# Patient Record
Sex: FEMALE | Race: BLACK OR AFRICAN AMERICAN | NOT HISPANIC OR LATINO | Employment: FULL TIME | URBAN - METROPOLITAN AREA
[De-identification: names, ages, dates, MRNs, and addresses within clinical notes are randomized per-mention and may not be internally consistent; named-entity substitution may affect disease eponyms.]

---

## 2021-01-11 ENCOUNTER — HOSPITAL ENCOUNTER (INPATIENT)
Facility: HOSPITAL | Age: 61
LOS: 1 days | Discharge: HOME | DRG: 177 | End: 2021-01-13
Attending: EMERGENCY MEDICINE | Admitting: EMERGENCY MEDICINE
Payer: COMMERCIAL

## 2021-01-11 DIAGNOSIS — R06.02 SOB (SHORTNESS OF BREATH): ICD-10-CM

## 2021-01-11 DIAGNOSIS — U07.1 COVID-19 VIRUS INFECTION: Primary | ICD-10-CM

## 2021-01-11 DIAGNOSIS — R09.02 HYPOXEMIA: ICD-10-CM

## 2021-01-11 LAB
BASOPHILS # BLD AUTO: 0.02 K/UL (ref 0–0.2)
BASOPHILS NFR BLD: 0.3 % (ref 0–1.9)
BNP SERPL-MCNC: <10 PG/ML (ref 0–99)
CTP QC/QA: YES
D DIMER PPP IA.FEU-MCNC: 0.51 MG/L FEU
DIFFERENTIAL METHOD: ABNORMAL
EOSINOPHIL # BLD AUTO: 0.1 K/UL (ref 0–0.5)
EOSINOPHIL NFR BLD: 0.7 % (ref 0–8)
ERYTHROCYTE [DISTWIDTH] IN BLOOD BY AUTOMATED COUNT: 14.6 % (ref 11.5–14.5)
HCT VFR BLD AUTO: 46.6 % (ref 37–48.5)
HGB BLD-MCNC: 15 G/DL (ref 12–16)
IMM GRANULOCYTES # BLD AUTO: 0.03 K/UL (ref 0–0.04)
IMM GRANULOCYTES NFR BLD AUTO: 0.4 % (ref 0–0.5)
LYMPHOCYTES # BLD AUTO: 2.8 K/UL (ref 1–4.8)
LYMPHOCYTES NFR BLD: 40.5 % (ref 18–48)
MAGNESIUM SERPL-MCNC: 2.3 MG/DL (ref 1.6–2.6)
MCH RBC QN AUTO: 27.6 PG (ref 27–31)
MCHC RBC AUTO-ENTMCNC: 32.2 G/DL (ref 32–36)
MCV RBC AUTO: 86 FL (ref 82–98)
MONOCYTES # BLD AUTO: 0.6 K/UL (ref 0.3–1)
MONOCYTES NFR BLD: 8.4 % (ref 4–15)
NEUTROPHILS # BLD AUTO: 3.5 K/UL (ref 1.8–7.7)
NEUTROPHILS NFR BLD: 49.7 % (ref 38–73)
NRBC BLD-RTO: 0 /100 WBC
PLATELET # BLD AUTO: 289 K/UL (ref 150–350)
PMV BLD AUTO: 10.3 FL (ref 9.2–12.9)
RBC # BLD AUTO: 5.44 M/UL (ref 4–5.4)
SARS-COV-2 RDRP RESP QL NAA+PROBE: POSITIVE
WBC # BLD AUTO: 7.02 K/UL (ref 3.9–12.7)

## 2021-01-11 PROCEDURE — 93010 ELECTROCARDIOGRAM REPORT: CPT | Mod: ,,, | Performed by: INTERNAL MEDICINE

## 2021-01-11 PROCEDURE — 94640 AIRWAY INHALATION TREATMENT: CPT

## 2021-01-11 PROCEDURE — 83880 ASSAY OF NATRIURETIC PEPTIDE: CPT

## 2021-01-11 PROCEDURE — 25000242 PHARM REV CODE 250 ALT 637 W/ HCPCS: Performed by: EMERGENCY MEDICINE

## 2021-01-11 PROCEDURE — 96365 THER/PROPH/DIAG IV INF INIT: CPT

## 2021-01-11 PROCEDURE — 83735 ASSAY OF MAGNESIUM: CPT

## 2021-01-11 PROCEDURE — 85025 COMPLETE CBC W/AUTO DIFF WBC: CPT

## 2021-01-11 PROCEDURE — 80053 COMPREHEN METABOLIC PANEL: CPT

## 2021-01-11 PROCEDURE — 99285 EMERGENCY DEPT VISIT HI MDM: CPT | Mod: 25

## 2021-01-11 PROCEDURE — 84484 ASSAY OF TROPONIN QUANT: CPT

## 2021-01-11 PROCEDURE — 93010 EKG 12-LEAD: ICD-10-PCS | Mod: ,,, | Performed by: INTERNAL MEDICINE

## 2021-01-11 PROCEDURE — 87502 INFLUENZA DNA AMP PROBE: CPT | Mod: 59

## 2021-01-11 PROCEDURE — U0002 COVID-19 LAB TEST NON-CDC: HCPCS | Performed by: EMERGENCY MEDICINE

## 2021-01-11 PROCEDURE — 27100098 HC SPACER

## 2021-01-11 PROCEDURE — 94761 N-INVAS EAR/PLS OXIMETRY MLT: CPT

## 2021-01-11 PROCEDURE — 96375 TX/PRO/DX INJ NEW DRUG ADDON: CPT

## 2021-01-11 PROCEDURE — 93005 ELECTROCARDIOGRAM TRACING: CPT

## 2021-01-11 PROCEDURE — 85379 FIBRIN DEGRADATION QUANT: CPT

## 2021-01-11 RX ORDER — ALBUTEROL SULFATE 90 UG/1
4 AEROSOL, METERED RESPIRATORY (INHALATION) EVERY 4 HOURS PRN
Status: DISCONTINUED | OUTPATIENT
Start: 2021-01-12 | End: 2021-01-13 | Stop reason: HOSPADM

## 2021-01-11 RX ORDER — ALBUTEROL SULFATE 2.5 MG/.5ML
5 SOLUTION RESPIRATORY (INHALATION)
Status: DISCONTINUED | OUTPATIENT
Start: 2021-01-11 | End: 2021-01-11 | Stop reason: DRUGHIGH

## 2021-01-11 RX ORDER — IPRATROPIUM BROMIDE 0.5 MG/2.5ML
1 SOLUTION RESPIRATORY (INHALATION) ONCE
Status: DISCONTINUED | OUTPATIENT
Start: 2021-01-11 | End: 2021-01-11 | Stop reason: DRUGHIGH

## 2021-01-11 RX ORDER — DEXAMETHASONE SODIUM PHOSPHATE 4 MG/ML
8 INJECTION, SOLUTION INTRA-ARTICULAR; INTRALESIONAL; INTRAMUSCULAR; INTRAVENOUS; SOFT TISSUE
Status: COMPLETED | OUTPATIENT
Start: 2021-01-11 | End: 2021-01-12

## 2021-01-11 RX ORDER — ALBUTEROL SULFATE 90 UG/1
4 AEROSOL, METERED RESPIRATORY (INHALATION) ONCE
Status: COMPLETED | OUTPATIENT
Start: 2021-01-11 | End: 2021-01-11

## 2021-01-11 RX ADMIN — ALBUTEROL SULFATE 4 PUFF: 90 AEROSOL, METERED RESPIRATORY (INHALATION) at 10:01

## 2021-01-12 PROBLEM — U07.1 COVID-19 VIRUS INFECTION: Status: ACTIVE | Noted: 2021-01-12

## 2021-01-12 PROBLEM — J96.01 ACUTE RESPIRATORY FAILURE WITH HYPOXIA: Status: ACTIVE | Noted: 2021-01-12

## 2021-01-12 PROBLEM — J12.82 PNEUMONIA DUE TO COVID-19 VIRUS: Status: ACTIVE | Noted: 2021-01-12

## 2021-01-12 LAB
25(OH)D3+25(OH)D2 SERPL-MCNC: 26 NG/ML (ref 30–96)
ALBUMIN SERPL BCP-MCNC: 3.5 G/DL (ref 3.5–5.2)
ALBUMIN SERPL BCP-MCNC: 3.6 G/DL (ref 3.5–5.2)
ALP SERPL-CCNC: 75 U/L (ref 55–135)
ALP SERPL-CCNC: 80 U/L (ref 55–135)
ALT SERPL W/O P-5'-P-CCNC: 26 U/L (ref 10–44)
ALT SERPL W/O P-5'-P-CCNC: 27 U/L (ref 10–44)
ANION GAP SERPL CALC-SCNC: 12 MMOL/L (ref 8–16)
ANION GAP SERPL CALC-SCNC: 14 MMOL/L (ref 8–16)
AST SERPL-CCNC: 23 U/L (ref 10–40)
AST SERPL-CCNC: 26 U/L (ref 10–40)
BASOPHILS # BLD AUTO: 0.01 K/UL (ref 0–0.2)
BASOPHILS NFR BLD: 0.2 % (ref 0–1.9)
BILIRUB SERPL-MCNC: 0.7 MG/DL (ref 0.1–1)
BILIRUB SERPL-MCNC: 0.8 MG/DL (ref 0.1–1)
BUN SERPL-MCNC: 12 MG/DL (ref 6–20)
BUN SERPL-MCNC: 9 MG/DL (ref 6–20)
CALCIUM SERPL-MCNC: 9.3 MG/DL (ref 8.7–10.5)
CALCIUM SERPL-MCNC: 9.4 MG/DL (ref 8.7–10.5)
CHLORIDE SERPL-SCNC: 101 MMOL/L (ref 95–110)
CHLORIDE SERPL-SCNC: 102 MMOL/L (ref 95–110)
CK SERPL-CCNC: 58 U/L (ref 20–180)
CO2 SERPL-SCNC: 24 MMOL/L (ref 23–29)
CO2 SERPL-SCNC: 26 MMOL/L (ref 23–29)
CREAT SERPL-MCNC: 0.7 MG/DL (ref 0.5–1.4)
CREAT SERPL-MCNC: 0.7 MG/DL (ref 0.5–1.4)
CRP SERPL-MCNC: 62.3 MG/L (ref 0–8.2)
DIFFERENTIAL METHOD: ABNORMAL
EOSINOPHIL # BLD AUTO: 0 K/UL (ref 0–0.5)
EOSINOPHIL NFR BLD: 0 % (ref 0–8)
ERYTHROCYTE [DISTWIDTH] IN BLOOD BY AUTOMATED COUNT: 14.3 % (ref 11.5–14.5)
ERYTHROCYTE [SEDIMENTATION RATE] IN BLOOD BY WESTERGREN METHOD: 66 MM/HR (ref 0–20)
EST. GFR  (AFRICAN AMERICAN): >60 ML/MIN/1.73 M^2
EST. GFR  (AFRICAN AMERICAN): >60 ML/MIN/1.73 M^2
EST. GFR  (NON AFRICAN AMERICAN): >60 ML/MIN/1.73 M^2
EST. GFR  (NON AFRICAN AMERICAN): >60 ML/MIN/1.73 M^2
FERRITIN SERPL-MCNC: 453 NG/ML (ref 20–300)
FERRITIN SERPL-MCNC: 453 NG/ML (ref 20–300)
GLUCOSE SERPL-MCNC: 102 MG/DL (ref 70–110)
GLUCOSE SERPL-MCNC: 155 MG/DL (ref 70–110)
HCT VFR BLD AUTO: 43.7 % (ref 37–48.5)
HGB BLD-MCNC: 14.4 G/DL (ref 12–16)
IMM GRANULOCYTES # BLD AUTO: 0.02 K/UL (ref 0–0.04)
IMM GRANULOCYTES NFR BLD AUTO: 0.4 % (ref 0–0.5)
INFLUENZA A, MOLECULAR: NEGATIVE
INFLUENZA B, MOLECULAR: NEGATIVE
LACTATE SERPL-SCNC: 1 MMOL/L (ref 0.5–2.2)
LDH SERPL L TO P-CCNC: 331 U/L (ref 110–260)
LDH SERPL L TO P-CCNC: 331 U/L (ref 110–260)
LYMPHOCYTES # BLD AUTO: 1 K/UL (ref 1–4.8)
LYMPHOCYTES NFR BLD: 17.8 % (ref 18–48)
MAGNESIUM SERPL-MCNC: 2.3 MG/DL (ref 1.6–2.6)
MCH RBC QN AUTO: 27.7 PG (ref 27–31)
MCHC RBC AUTO-ENTMCNC: 33 G/DL (ref 32–36)
MCV RBC AUTO: 84 FL (ref 82–98)
MONOCYTES # BLD AUTO: 0.1 K/UL (ref 0.3–1)
MONOCYTES NFR BLD: 1.6 % (ref 4–15)
NEUTROPHILS # BLD AUTO: 4.5 K/UL (ref 1.8–7.7)
NEUTROPHILS NFR BLD: 80 % (ref 38–73)
NRBC BLD-RTO: 0 /100 WBC
PHOSPHATE SERPL-MCNC: 3.3 MG/DL (ref 2.7–4.5)
PLATELET # BLD AUTO: 347 K/UL (ref 150–350)
PMV BLD AUTO: 9.8 FL (ref 9.2–12.9)
POTASSIUM SERPL-SCNC: 3.8 MMOL/L (ref 3.5–5.1)
POTASSIUM SERPL-SCNC: 4.4 MMOL/L (ref 3.5–5.1)
PROCALCITONIN SERPL IA-MCNC: 0.05 NG/ML
PROT SERPL-MCNC: 7.8 G/DL (ref 6–8.4)
PROT SERPL-MCNC: 8 G/DL (ref 6–8.4)
RBC # BLD AUTO: 5.19 M/UL (ref 4–5.4)
SODIUM SERPL-SCNC: 138 MMOL/L (ref 136–145)
SODIUM SERPL-SCNC: 141 MMOL/L (ref 136–145)
SPECIMEN SOURCE: NORMAL
TROPONIN I SERPL DL<=0.01 NG/ML-MCNC: <0.006 NG/ML (ref 0–0.03)
WBC # BLD AUTO: 5.62 K/UL (ref 3.9–12.7)

## 2021-01-12 PROCEDURE — 25000242 PHARM REV CODE 250 ALT 637 W/ HCPCS: Performed by: INTERNAL MEDICINE

## 2021-01-12 PROCEDURE — 83735 ASSAY OF MAGNESIUM: CPT

## 2021-01-12 PROCEDURE — 85025 COMPLETE CBC W/AUTO DIFF WBC: CPT

## 2021-01-12 PROCEDURE — 87040 BLOOD CULTURE FOR BACTERIA: CPT | Mod: 59

## 2021-01-12 PROCEDURE — 82550 ASSAY OF CK (CPK): CPT

## 2021-01-12 PROCEDURE — 25000003 PHARM REV CODE 250: Performed by: EMERGENCY MEDICINE

## 2021-01-12 PROCEDURE — 87502 INFLUENZA DNA AMP PROBE: CPT

## 2021-01-12 PROCEDURE — 86140 C-REACTIVE PROTEIN: CPT

## 2021-01-12 PROCEDURE — 25000003 PHARM REV CODE 250: Performed by: INTERNAL MEDICINE

## 2021-01-12 PROCEDURE — 11000001 HC ACUTE MED/SURG PRIVATE ROOM

## 2021-01-12 PROCEDURE — 25000003 PHARM REV CODE 250: Performed by: STUDENT IN AN ORGANIZED HEALTH CARE EDUCATION/TRAINING PROGRAM

## 2021-01-12 PROCEDURE — 83615 LACTATE (LD) (LDH) ENZYME: CPT

## 2021-01-12 PROCEDURE — 63600175 PHARM REV CODE 636 W HCPCS: Performed by: INTERNAL MEDICINE

## 2021-01-12 PROCEDURE — 83605 ASSAY OF LACTIC ACID: CPT

## 2021-01-12 PROCEDURE — 25000242 PHARM REV CODE 250 ALT 637 W/ HCPCS: Performed by: STUDENT IN AN ORGANIZED HEALTH CARE EDUCATION/TRAINING PROGRAM

## 2021-01-12 PROCEDURE — 63600175 PHARM REV CODE 636 W HCPCS: Performed by: EMERGENCY MEDICINE

## 2021-01-12 PROCEDURE — 80053 COMPREHEN METABOLIC PANEL: CPT

## 2021-01-12 PROCEDURE — 94640 AIRWAY INHALATION TREATMENT: CPT

## 2021-01-12 PROCEDURE — 84145 PROCALCITONIN (PCT): CPT

## 2021-01-12 PROCEDURE — 82306 VITAMIN D 25 HYDROXY: CPT

## 2021-01-12 PROCEDURE — 63600175 PHARM REV CODE 636 W HCPCS: Performed by: STUDENT IN AN ORGANIZED HEALTH CARE EDUCATION/TRAINING PROGRAM

## 2021-01-12 PROCEDURE — 94761 N-INVAS EAR/PLS OXIMETRY MLT: CPT

## 2021-01-12 PROCEDURE — 84100 ASSAY OF PHOSPHORUS: CPT

## 2021-01-12 PROCEDURE — 36415 COLL VENOUS BLD VENIPUNCTURE: CPT

## 2021-01-12 PROCEDURE — 85652 RBC SED RATE AUTOMATED: CPT

## 2021-01-12 PROCEDURE — 63700000 PHARM REV CODE 250 ALT 637 W/O HCPCS: Performed by: STUDENT IN AN ORGANIZED HEALTH CARE EDUCATION/TRAINING PROGRAM

## 2021-01-12 PROCEDURE — 87449 NOS EACH ORGANISM AG IA: CPT

## 2021-01-12 PROCEDURE — 82728 ASSAY OF FERRITIN: CPT

## 2021-01-12 RX ORDER — ASCORBIC ACID 500 MG
500 TABLET ORAL 2 TIMES DAILY
Status: DISCONTINUED | OUTPATIENT
Start: 2021-01-12 | End: 2021-01-13 | Stop reason: HOSPADM

## 2021-01-12 RX ORDER — ALBUTEROL SULFATE 90 UG/1
2 AEROSOL, METERED RESPIRATORY (INHALATION) EVERY 6 HOURS
Status: DISCONTINUED | OUTPATIENT
Start: 2021-01-12 | End: 2021-01-12

## 2021-01-12 RX ORDER — SODIUM CHLORIDE 0.9 % (FLUSH) 0.9 %
10 SYRINGE (ML) INJECTION
Status: DISCONTINUED | OUTPATIENT
Start: 2021-01-12 | End: 2021-01-13 | Stop reason: HOSPADM

## 2021-01-12 RX ORDER — LOSARTAN POTASSIUM AND HYDROCHLOROTHIAZIDE 12.5; 5 MG/1; MG/1
1 TABLET ORAL DAILY
Status: DISCONTINUED | OUTPATIENT
Start: 2021-01-12 | End: 2021-01-12

## 2021-01-12 RX ORDER — ONDANSETRON 8 MG/1
8 TABLET, ORALLY DISINTEGRATING ORAL EVERY 8 HOURS PRN
Status: DISCONTINUED | OUTPATIENT
Start: 2021-01-12 | End: 2021-01-13 | Stop reason: HOSPADM

## 2021-01-12 RX ORDER — IBUPROFEN 200 MG
16 TABLET ORAL
Status: DISCONTINUED | OUTPATIENT
Start: 2021-01-12 | End: 2021-01-13 | Stop reason: HOSPADM

## 2021-01-12 RX ORDER — TALC
6 POWDER (GRAM) TOPICAL NIGHTLY PRN
Status: DISCONTINUED | OUTPATIENT
Start: 2021-01-12 | End: 2021-01-13 | Stop reason: HOSPADM

## 2021-01-12 RX ORDER — HYDROCHLOROTHIAZIDE 25 MG/1
25 TABLET ORAL DAILY
Status: DISCONTINUED | OUTPATIENT
Start: 2021-01-13 | End: 2021-01-12

## 2021-01-12 RX ORDER — ALBUTEROL SULFATE 90 UG/1
2 AEROSOL, METERED RESPIRATORY (INHALATION)
Status: DISCONTINUED | OUTPATIENT
Start: 2021-01-12 | End: 2021-01-13 | Stop reason: HOSPADM

## 2021-01-12 RX ORDER — AZITHROMYCIN 250 MG/1
500 TABLET, FILM COATED ORAL
Status: DISCONTINUED | OUTPATIENT
Start: 2021-01-12 | End: 2021-01-12

## 2021-01-12 RX ORDER — DIPHENOXYLATE HYDROCHLORIDE AND ATROPINE SULFATE 2.5; .025 MG/1; MG/1
1 TABLET ORAL 4 TIMES DAILY PRN
Status: DISCONTINUED | OUTPATIENT
Start: 2021-01-12 | End: 2021-01-13 | Stop reason: HOSPADM

## 2021-01-12 RX ORDER — AZITHROMYCIN 250 MG/1
500 TABLET, FILM COATED ORAL
Status: DISCONTINUED | OUTPATIENT
Start: 2021-01-12 | End: 2021-01-13 | Stop reason: HOSPADM

## 2021-01-12 RX ORDER — HYDROCHLOROTHIAZIDE 25 MG/1
25 TABLET ORAL DAILY
Status: DISCONTINUED | OUTPATIENT
Start: 2021-01-12 | End: 2021-01-13 | Stop reason: HOSPADM

## 2021-01-12 RX ORDER — IBUPROFEN 200 MG
24 TABLET ORAL
Status: DISCONTINUED | OUTPATIENT
Start: 2021-01-12 | End: 2021-01-13 | Stop reason: HOSPADM

## 2021-01-12 RX ORDER — GLUCAGON 1 MG
1 KIT INJECTION
Status: DISCONTINUED | OUTPATIENT
Start: 2021-01-12 | End: 2021-01-13 | Stop reason: HOSPADM

## 2021-01-12 RX ORDER — LOSARTAN POTASSIUM 25 MG/1
100 TABLET ORAL DAILY
Status: DISCONTINUED | OUTPATIENT
Start: 2021-01-12 | End: 2021-01-13 | Stop reason: HOSPADM

## 2021-01-12 RX ORDER — GUAIFENESIN/DEXTROMETHORPHAN 100-10MG/5
10 SYRUP ORAL EVERY 4 HOURS PRN
Status: DISCONTINUED | OUTPATIENT
Start: 2021-01-12 | End: 2021-01-13 | Stop reason: HOSPADM

## 2021-01-12 RX ORDER — AMOXICILLIN 250 MG
1 CAPSULE ORAL 2 TIMES DAILY PRN
Status: DISCONTINUED | OUTPATIENT
Start: 2021-01-12 | End: 2021-01-13 | Stop reason: HOSPADM

## 2021-01-12 RX ORDER — ENOXAPARIN SODIUM 100 MG/ML
40 INJECTION SUBCUTANEOUS EVERY 24 HOURS
Status: DISCONTINUED | OUTPATIENT
Start: 2021-01-12 | End: 2021-01-13 | Stop reason: HOSPADM

## 2021-01-12 RX ORDER — CHOLECALCIFEROL (VITAMIN D3) 25 MCG
1000 TABLET ORAL DAILY
Status: DISCONTINUED | OUTPATIENT
Start: 2021-01-12 | End: 2021-01-13 | Stop reason: HOSPADM

## 2021-01-12 RX ORDER — ACETAMINOPHEN 325 MG/1
650 TABLET ORAL EVERY 4 HOURS PRN
Status: DISCONTINUED | OUTPATIENT
Start: 2021-01-12 | End: 2021-01-13 | Stop reason: HOSPADM

## 2021-01-12 RX ORDER — LOSARTAN POTASSIUM 25 MG/1
50 TABLET ORAL DAILY
Status: DISCONTINUED | OUTPATIENT
Start: 2021-01-12 | End: 2021-01-12

## 2021-01-12 RX ORDER — OXYCODONE HYDROCHLORIDE 5 MG/1
5 TABLET ORAL EVERY 6 HOURS PRN
Status: DISCONTINUED | OUTPATIENT
Start: 2021-01-12 | End: 2021-01-13 | Stop reason: HOSPADM

## 2021-01-12 RX ADMIN — HYDROCHLOROTHIAZIDE 25 MG: 25 TABLET ORAL at 08:01

## 2021-01-12 RX ADMIN — OXYCODONE HYDROCHLORIDE AND ACETAMINOPHEN 500 MG: 500 TABLET ORAL at 07:01

## 2021-01-12 RX ADMIN — GUAIFENESIN AND DEXTROMETHORPHAN 10 ML: 100; 10 SYRUP ORAL at 07:01

## 2021-01-12 RX ADMIN — LOSARTAN POTASSIUM 100 MG: 25 TABLET, FILM COATED ORAL at 08:01

## 2021-01-12 RX ADMIN — ALBUTEROL SULFATE 2 PUFF: 90 AEROSOL, METERED RESPIRATORY (INHALATION) at 08:01

## 2021-01-12 RX ADMIN — AZITHROMYCIN MONOHYDRATE 500 MG: 250 TABLET ORAL at 07:01

## 2021-01-12 RX ADMIN — THERA TABS 1 TABLET: TAB at 08:01

## 2021-01-12 RX ADMIN — DEXAMETHASONE 6 MG: 4 TABLET ORAL at 07:01

## 2021-01-12 RX ADMIN — IPRATROPIUM BROMIDE 2 PUFF: 17 AEROSOL, METERED RESPIRATORY (INHALATION) at 01:01

## 2021-01-12 RX ADMIN — IPRATROPIUM BROMIDE 2 PUFF: 17 AEROSOL, METERED RESPIRATORY (INHALATION) at 08:01

## 2021-01-12 RX ADMIN — CEFTRIAXONE 1 G: 1 INJECTION, SOLUTION INTRAVENOUS at 08:01

## 2021-01-12 RX ADMIN — ALBUTEROL SULFATE 2 PUFF: 90 AEROSOL, METERED RESPIRATORY (INHALATION) at 07:01

## 2021-01-12 RX ADMIN — AZITHROMYCIN 500 MG: 500 INJECTION, POWDER, LYOPHILIZED, FOR SOLUTION INTRAVENOUS at 12:01

## 2021-01-12 RX ADMIN — ALBUTEROL SULFATE 2 PUFF: 90 AEROSOL, METERED RESPIRATORY (INHALATION) at 01:01

## 2021-01-12 RX ADMIN — GUAIFENESIN AND DEXTROMETHORPHAN 10 ML: 100; 10 SYRUP ORAL at 02:01

## 2021-01-12 RX ADMIN — CHOLECALCIFEROL TAB 25 MCG (1000 UNIT) 1000 UNITS: 25 TAB at 08:01

## 2021-01-12 RX ADMIN — CEFTRIAXONE 1 G: 1 INJECTION, SOLUTION INTRAVENOUS at 12:01

## 2021-01-12 RX ADMIN — OXYCODONE HYDROCHLORIDE AND ACETAMINOPHEN 500 MG: 500 TABLET ORAL at 08:01

## 2021-01-12 RX ADMIN — DOCUSATE SODIUM 50 MG AND SENNOSIDES 8.6 MG 1 TABLET: 8.6; 5 TABLET, FILM COATED ORAL at 02:01

## 2021-01-12 RX ADMIN — REMDESIVIR 200 MG: 100 INJECTION, POWDER, LYOPHILIZED, FOR SOLUTION INTRAVENOUS at 03:01

## 2021-01-12 RX ADMIN — ENOXAPARIN SODIUM 40 MG: 40 INJECTION SUBCUTANEOUS at 04:01

## 2021-01-12 RX ADMIN — IPRATROPIUM BROMIDE 2 PUFF: 17 AEROSOL, METERED RESPIRATORY (INHALATION) at 07:01

## 2021-01-12 RX ADMIN — DEXAMETHASONE SODIUM PHOSPHATE 8 MG: 4 INJECTION INTRA-ARTICULAR; INTRALESIONAL; INTRAMUSCULAR; INTRAVENOUS; SOFT TISSUE at 12:01

## 2021-01-13 VITALS
WEIGHT: 242.5 LBS | OXYGEN SATURATION: 94 % | HEIGHT: 66 IN | SYSTOLIC BLOOD PRESSURE: 134 MMHG | RESPIRATION RATE: 18 BRPM | DIASTOLIC BLOOD PRESSURE: 73 MMHG | TEMPERATURE: 98 F | BODY MASS INDEX: 38.97 KG/M2 | HEART RATE: 79 BPM

## 2021-01-13 LAB
ALBUMIN SERPL BCP-MCNC: 3.6 G/DL (ref 3.5–5.2)
ALP SERPL-CCNC: 76 U/L (ref 55–135)
ALT SERPL W/O P-5'-P-CCNC: 30 U/L (ref 10–44)
ANION GAP SERPL CALC-SCNC: 12 MMOL/L (ref 8–16)
AST SERPL-CCNC: 21 U/L (ref 10–40)
BASOPHILS # BLD AUTO: 0.01 K/UL (ref 0–0.2)
BASOPHILS NFR BLD: 0.2 % (ref 0–1.9)
BILIRUB SERPL-MCNC: 0.5 MG/DL (ref 0.1–1)
BUN SERPL-MCNC: 13 MG/DL (ref 6–20)
CALCIUM SERPL-MCNC: 9.6 MG/DL (ref 8.7–10.5)
CHLORIDE SERPL-SCNC: 105 MMOL/L (ref 95–110)
CO2 SERPL-SCNC: 22 MMOL/L (ref 23–29)
CREAT SERPL-MCNC: 0.7 MG/DL (ref 0.5–1.4)
DIFFERENTIAL METHOD: ABNORMAL
EOSINOPHIL # BLD AUTO: 0 K/UL (ref 0–0.5)
EOSINOPHIL NFR BLD: 0 % (ref 0–8)
ERYTHROCYTE [DISTWIDTH] IN BLOOD BY AUTOMATED COUNT: 14.3 % (ref 11.5–14.5)
EST. GFR  (AFRICAN AMERICAN): >60 ML/MIN/1.73 M^2
EST. GFR  (NON AFRICAN AMERICAN): >60 ML/MIN/1.73 M^2
GLUCOSE SERPL-MCNC: 134 MG/DL (ref 70–110)
HCT VFR BLD AUTO: 44.5 % (ref 37–48.5)
HGB BLD-MCNC: 14.6 G/DL (ref 12–16)
IMM GRANULOCYTES # BLD AUTO: 0.03 K/UL (ref 0–0.04)
IMM GRANULOCYTES NFR BLD AUTO: 0.5 % (ref 0–0.5)
L PNEUMO AG UR QL IA: NEGATIVE
LYMPHOCYTES # BLD AUTO: 1.4 K/UL (ref 1–4.8)
LYMPHOCYTES NFR BLD: 21.3 % (ref 18–48)
MAGNESIUM SERPL-MCNC: 2.2 MG/DL (ref 1.6–2.6)
MCH RBC QN AUTO: 27.5 PG (ref 27–31)
MCHC RBC AUTO-ENTMCNC: 32.8 G/DL (ref 32–36)
MCV RBC AUTO: 84 FL (ref 82–98)
MONOCYTES # BLD AUTO: 0.3 K/UL (ref 0.3–1)
MONOCYTES NFR BLD: 4.5 % (ref 4–15)
NEUTROPHILS # BLD AUTO: 4.8 K/UL (ref 1.8–7.7)
NEUTROPHILS NFR BLD: 73.5 % (ref 38–73)
NRBC BLD-RTO: 0 /100 WBC
PHOSPHATE SERPL-MCNC: 3.7 MG/DL (ref 2.7–4.5)
PLATELET # BLD AUTO: 458 K/UL (ref 150–350)
PMV BLD AUTO: 9.5 FL (ref 9.2–12.9)
POTASSIUM SERPL-SCNC: 4.4 MMOL/L (ref 3.5–5.1)
PROT SERPL-MCNC: 8 G/DL (ref 6–8.4)
RBC # BLD AUTO: 5.3 M/UL (ref 4–5.4)
SODIUM SERPL-SCNC: 139 MMOL/L (ref 136–145)
WBC # BLD AUTO: 6.47 K/UL (ref 3.9–12.7)

## 2021-01-13 PROCEDURE — 83735 ASSAY OF MAGNESIUM: CPT

## 2021-01-13 PROCEDURE — 84100 ASSAY OF PHOSPHORUS: CPT

## 2021-01-13 PROCEDURE — 99239 HOSP IP/OBS DSCHRG MGMT >30: CPT | Mod: ,,, | Performed by: HOSPITALIST

## 2021-01-13 PROCEDURE — 94761 N-INVAS EAR/PLS OXIMETRY MLT: CPT

## 2021-01-13 PROCEDURE — 99239 PR HOSPITAL DISCHARGE DAY,>30 MIN: ICD-10-PCS | Mod: ,,, | Performed by: HOSPITALIST

## 2021-01-13 PROCEDURE — 63600175 PHARM REV CODE 636 W HCPCS: Performed by: STUDENT IN AN ORGANIZED HEALTH CARE EDUCATION/TRAINING PROGRAM

## 2021-01-13 PROCEDURE — 25000003 PHARM REV CODE 250: Performed by: STUDENT IN AN ORGANIZED HEALTH CARE EDUCATION/TRAINING PROGRAM

## 2021-01-13 PROCEDURE — 94640 AIRWAY INHALATION TREATMENT: CPT

## 2021-01-13 PROCEDURE — 36415 COLL VENOUS BLD VENIPUNCTURE: CPT

## 2021-01-13 PROCEDURE — 25000242 PHARM REV CODE 250 ALT 637 W/ HCPCS: Performed by: STUDENT IN AN ORGANIZED HEALTH CARE EDUCATION/TRAINING PROGRAM

## 2021-01-13 PROCEDURE — 85025 COMPLETE CBC W/AUTO DIFF WBC: CPT

## 2021-01-13 PROCEDURE — 25000003 PHARM REV CODE 250: Performed by: INTERNAL MEDICINE

## 2021-01-13 PROCEDURE — 80053 COMPREHEN METABOLIC PANEL: CPT

## 2021-01-13 RX ORDER — LEVOFLOXACIN 500 MG/1
500 TABLET, FILM COATED ORAL DAILY
Qty: 3 TABLET | Refills: 0 | Status: SHIPPED | OUTPATIENT
Start: 2021-01-13

## 2021-01-13 RX ORDER — GUAIFENESIN/DEXTROMETHORPHAN 100-10MG/5
10 SYRUP ORAL EVERY 4 HOURS PRN
Refills: 0 | COMMUNITY
Start: 2021-01-13 | End: 2021-01-23

## 2021-01-13 RX ORDER — PROMETHAZINE HYDROCHLORIDE AND CODEINE PHOSPHATE 6.25; 1 MG/5ML; MG/5ML
5 SOLUTION ORAL EVERY 6 HOURS PRN
Qty: 118 ML | Refills: 0 | Status: SHIPPED | OUTPATIENT
Start: 2021-01-13 | End: 2021-01-23

## 2021-01-13 RX ORDER — PROMETHAZINE HYDROCHLORIDE AND CODEINE PHOSPHATE 6.25; 1 MG/5ML; MG/5ML
5 SOLUTION ORAL EVERY 6 HOURS PRN
Qty: 118 ML | Refills: 0 | Status: SHIPPED | OUTPATIENT
Start: 2021-01-13 | End: 2021-01-13 | Stop reason: SDUPTHER

## 2021-01-13 RX ORDER — CHOLECALCIFEROL (VITAMIN D3) 25 MCG
1000 TABLET ORAL DAILY
Qty: 30 TABLET | Refills: 1 | Status: SHIPPED | OUTPATIENT
Start: 2021-01-14

## 2021-01-13 RX ORDER — ALBUTEROL SULFATE 90 UG/1
2 AEROSOL, METERED RESPIRATORY (INHALATION)
Qty: 18 G | Refills: 0 | Status: SHIPPED | OUTPATIENT
Start: 2021-01-13 | End: 2022-01-13

## 2021-01-13 RX ORDER — ASCORBIC ACID 500 MG
500 TABLET ORAL 2 TIMES DAILY
COMMUNITY
Start: 2021-01-13

## 2021-01-13 RX ADMIN — Medication 6 MG: at 12:01

## 2021-01-13 RX ADMIN — IPRATROPIUM BROMIDE 2 PUFF: 17 AEROSOL, METERED RESPIRATORY (INHALATION) at 01:01

## 2021-01-13 RX ADMIN — DEXAMETHASONE 6 MG: 4 TABLET ORAL at 08:01

## 2021-01-13 RX ADMIN — ALBUTEROL SULFATE 2 PUFF: 90 AEROSOL, METERED RESPIRATORY (INHALATION) at 07:01

## 2021-01-13 RX ADMIN — CHOLECALCIFEROL TAB 25 MCG (1000 UNIT) 1000 UNITS: 25 TAB at 08:01

## 2021-01-13 RX ADMIN — LOSARTAN POTASSIUM 100 MG: 25 TABLET, FILM COATED ORAL at 08:01

## 2021-01-13 RX ADMIN — OXYCODONE HYDROCHLORIDE AND ACETAMINOPHEN 500 MG: 500 TABLET ORAL at 08:01

## 2021-01-13 RX ADMIN — THERA TABS 1 TABLET: TAB at 08:01

## 2021-01-13 RX ADMIN — ALBUTEROL SULFATE 2 PUFF: 90 AEROSOL, METERED RESPIRATORY (INHALATION) at 01:01

## 2021-01-13 RX ADMIN — HYDROCHLOROTHIAZIDE 25 MG: 25 TABLET ORAL at 08:01

## 2021-01-13 RX ADMIN — IPRATROPIUM BROMIDE 2 PUFF: 17 AEROSOL, METERED RESPIRATORY (INHALATION) at 07:01

## 2021-01-14 ENCOUNTER — PATIENT OUTREACH (OUTPATIENT)
Dept: ADMINISTRATIVE | Facility: CLINIC | Age: 61
End: 2021-01-14

## 2021-01-14 ENCOUNTER — TELEPHONE (OUTPATIENT)
Dept: ADMINISTRATIVE | Facility: CLINIC | Age: 61
End: 2021-01-14

## 2021-01-15 ENCOUNTER — NURSE TRIAGE (OUTPATIENT)
Dept: ADMINISTRATIVE | Facility: CLINIC | Age: 61
End: 2021-01-15

## 2021-01-15 ENCOUNTER — TELEPHONE (OUTPATIENT)
Dept: ADMINISTRATIVE | Facility: CLINIC | Age: 61
End: 2021-01-15

## 2021-01-17 LAB
BACTERIA BLD CULT: NORMAL
BACTERIA BLD CULT: NORMAL

## 2021-01-20 ENCOUNTER — LAB VISIT (OUTPATIENT)
Dept: PRIMARY CARE CLINIC | Facility: OTHER | Age: 61
End: 2021-01-20
Attending: INTERNAL MEDICINE
Payer: COMMERCIAL

## 2021-01-20 DIAGNOSIS — Z20.822 ENCOUNTER FOR LABORATORY TESTING FOR COVID-19 VIRUS: ICD-10-CM

## 2021-01-20 PROCEDURE — U0003 INFECTIOUS AGENT DETECTION BY NUCLEIC ACID (DNA OR RNA); SEVERE ACUTE RESPIRATORY SYNDROME CORONAVIRUS 2 (SARS-COV-2) (CORONAVIRUS DISEASE [COVID-19]), AMPLIFIED PROBE TECHNIQUE, MAKING USE OF HIGH THROUGHPUT TECHNOLOGIES AS DESCRIBED BY CMS-2020-01-R: HCPCS

## 2021-01-22 LAB — SARS-COV-2 RNA RESP QL NAA+PROBE: NOT DETECTED

## 2021-04-16 ENCOUNTER — PATIENT MESSAGE (OUTPATIENT)
Dept: RESEARCH | Facility: HOSPITAL | Age: 61
End: 2021-04-16

## 2021-07-01 ENCOUNTER — PATIENT MESSAGE (OUTPATIENT)
Dept: ADMINISTRATIVE | Facility: OTHER | Age: 61
End: 2021-07-01

## 2021-11-09 ENCOUNTER — HOSPITAL ENCOUNTER (EMERGENCY)
Facility: HOSPITAL | Age: 61
Discharge: HOME | End: 2021-11-09
Attending: EMERGENCY MEDICINE
Payer: COMMERCIAL

## 2021-11-09 VITALS
BODY MASS INDEX: 38.57 KG/M2 | WEIGHT: 240 LBS | SYSTOLIC BLOOD PRESSURE: 177 MMHG | HEART RATE: 75 BPM | RESPIRATION RATE: 19 BRPM | OXYGEN SATURATION: 99 % | HEIGHT: 66 IN | DIASTOLIC BLOOD PRESSURE: 82 MMHG | TEMPERATURE: 98 F

## 2021-11-09 DIAGNOSIS — I10 HYPERTENSION, UNCONTROLLED: Primary | ICD-10-CM

## 2021-11-09 DIAGNOSIS — R07.9 CHEST PAIN: ICD-10-CM

## 2021-11-09 LAB
ALBUMIN SERPL BCP-MCNC: 4.1 G/DL (ref 3.5–5.2)
ALP SERPL-CCNC: 88 U/L (ref 55–135)
ALT SERPL W/O P-5'-P-CCNC: 22 U/L (ref 10–44)
ANION GAP SERPL CALC-SCNC: 10 MMOL/L (ref 8–16)
AST SERPL-CCNC: 22 U/L (ref 10–40)
BASOPHILS # BLD AUTO: 0.02 K/UL (ref 0–0.2)
BASOPHILS NFR BLD: 0.2 % (ref 0–1.9)
BILIRUB SERPL-MCNC: 1.4 MG/DL (ref 0.1–1)
BNP SERPL-MCNC: <10 PG/ML (ref 0–99)
BUN SERPL-MCNC: 6 MG/DL (ref 6–20)
CALCIUM SERPL-MCNC: 10 MG/DL (ref 8.7–10.5)
CHLORIDE SERPL-SCNC: 106 MMOL/L (ref 95–110)
CO2 SERPL-SCNC: 25 MMOL/L (ref 23–29)
CREAT SERPL-MCNC: 0.6 MG/DL (ref 0.5–1.4)
DIFFERENTIAL METHOD: NORMAL
EOSINOPHIL # BLD AUTO: 0.1 K/UL (ref 0–0.5)
EOSINOPHIL NFR BLD: 0.6 % (ref 0–8)
ERYTHROCYTE [DISTWIDTH] IN BLOOD BY AUTOMATED COUNT: 14.1 % (ref 11.5–14.5)
EST. GFR  (AFRICAN AMERICAN): >60 ML/MIN/1.73 M^2
EST. GFR  (NON AFRICAN AMERICAN): >60 ML/MIN/1.73 M^2
GLUCOSE SERPL-MCNC: 93 MG/DL (ref 70–110)
HCT VFR BLD AUTO: 44.3 % (ref 37–48.5)
HGB BLD-MCNC: 14.5 G/DL (ref 12–16)
IMM GRANULOCYTES # BLD AUTO: 0.02 K/UL (ref 0–0.04)
IMM GRANULOCYTES NFR BLD AUTO: 0.2 % (ref 0–0.5)
LYMPHOCYTES # BLD AUTO: 2.7 K/UL (ref 1–4.8)
LYMPHOCYTES NFR BLD: 30.5 % (ref 18–48)
MCH RBC QN AUTO: 28.7 PG (ref 27–31)
MCHC RBC AUTO-ENTMCNC: 32.7 G/DL (ref 32–36)
MCV RBC AUTO: 88 FL (ref 82–98)
MONOCYTES # BLD AUTO: 0.6 K/UL (ref 0.3–1)
MONOCYTES NFR BLD: 6.6 % (ref 4–15)
NEUTROPHILS # BLD AUTO: 5.6 K/UL (ref 1.8–7.7)
NEUTROPHILS NFR BLD: 61.9 % (ref 38–73)
NRBC BLD-RTO: 0 /100 WBC
PLATELET # BLD AUTO: 274 K/UL (ref 150–450)
PMV BLD AUTO: 9.7 FL (ref 9.2–12.9)
POTASSIUM SERPL-SCNC: 4.3 MMOL/L (ref 3.5–5.1)
PROT SERPL-MCNC: 8 G/DL (ref 6–8.4)
RBC # BLD AUTO: 5.06 M/UL (ref 4–5.4)
SODIUM SERPL-SCNC: 141 MMOL/L (ref 136–145)
TROPONIN I SERPL DL<=0.01 NG/ML-MCNC: <0.006 NG/ML (ref 0–0.03)
WBC # BLD AUTO: 8.97 K/UL (ref 3.9–12.7)

## 2021-11-09 PROCEDURE — 80053 COMPREHEN METABOLIC PANEL: CPT | Performed by: EMERGENCY MEDICINE

## 2021-11-09 PROCEDURE — 85025 COMPLETE CBC W/AUTO DIFF WBC: CPT | Performed by: EMERGENCY MEDICINE

## 2021-11-09 PROCEDURE — 93010 EKG 12-LEAD: ICD-10-PCS | Mod: ,,, | Performed by: INTERNAL MEDICINE

## 2021-11-09 PROCEDURE — 83880 ASSAY OF NATRIURETIC PEPTIDE: CPT | Performed by: EMERGENCY MEDICINE

## 2021-11-09 PROCEDURE — 96374 THER/PROPH/DIAG INJ IV PUSH: CPT

## 2021-11-09 PROCEDURE — 93005 ELECTROCARDIOGRAM TRACING: CPT

## 2021-11-09 PROCEDURE — 93010 ELECTROCARDIOGRAM REPORT: CPT | Mod: ,,, | Performed by: INTERNAL MEDICINE

## 2021-11-09 PROCEDURE — 25000003 PHARM REV CODE 250: Performed by: EMERGENCY MEDICINE

## 2021-11-09 PROCEDURE — 84484 ASSAY OF TROPONIN QUANT: CPT | Performed by: EMERGENCY MEDICINE

## 2021-11-09 PROCEDURE — 99284 EMERGENCY DEPT VISIT MOD MDM: CPT | Mod: 25

## 2021-11-09 RX ORDER — OLMESARTAN MEDOXOMIL 40 MG/1
40 TABLET ORAL DAILY
COMMUNITY

## 2021-11-09 RX ORDER — ASPIRIN 325 MG
325 TABLET ORAL
Status: COMPLETED | OUTPATIENT
Start: 2021-11-09 | End: 2021-11-09

## 2021-11-09 RX ORDER — LABETALOL HYDROCHLORIDE 5 MG/ML
10 INJECTION, SOLUTION INTRAVENOUS
Status: COMPLETED | OUTPATIENT
Start: 2021-11-09 | End: 2021-11-09

## 2021-11-09 RX ADMIN — LABETALOL HYDROCHLORIDE 10 MG: 5 INJECTION INTRAVENOUS at 01:11

## 2021-11-09 RX ADMIN — ASPIRIN 325 MG ORAL TABLET 325 MG: 325 PILL ORAL at 01:11

## 2023-10-29 ENCOUNTER — HOSPITAL ENCOUNTER (EMERGENCY)
Facility: HOSPITAL | Age: 63
Discharge: HOME | End: 2023-10-29
Attending: STUDENT IN AN ORGANIZED HEALTH CARE EDUCATION/TRAINING PROGRAM
Payer: COMMERCIAL

## 2023-10-29 VITALS
SYSTOLIC BLOOD PRESSURE: 174 MMHG | DIASTOLIC BLOOD PRESSURE: 79 MMHG | TEMPERATURE: 98 F | HEART RATE: 82 BPM | RESPIRATION RATE: 18 BRPM | BODY MASS INDEX: 38.57 KG/M2 | HEIGHT: 66 IN | OXYGEN SATURATION: 97 % | WEIGHT: 240 LBS

## 2023-10-29 DIAGNOSIS — R51.9 NONINTRACTABLE HEADACHE, UNSPECIFIED CHRONICITY PATTERN, UNSPECIFIED HEADACHE TYPE: Primary | ICD-10-CM

## 2023-10-29 LAB
ALBUMIN SERPL BCP-MCNC: 4.4 G/DL (ref 3.5–5.2)
ALP SERPL-CCNC: 96 U/L (ref 55–135)
ALT SERPL W/O P-5'-P-CCNC: 27 U/L (ref 10–44)
ANION GAP SERPL CALC-SCNC: 8 MMOL/L (ref 8–16)
AST SERPL-CCNC: 23 U/L (ref 10–40)
BASOPHILS # BLD AUTO: 0.03 K/UL (ref 0–0.2)
BASOPHILS NFR BLD: 0.3 % (ref 0–1.9)
BILIRUB SERPL-MCNC: 1 MG/DL (ref 0.1–1)
BUN SERPL-MCNC: 11 MG/DL (ref 8–23)
CALCIUM SERPL-MCNC: 9.8 MG/DL (ref 8.7–10.5)
CHLORIDE SERPL-SCNC: 107 MMOL/L (ref 95–110)
CO2 SERPL-SCNC: 27 MMOL/L (ref 23–29)
CREAT SERPL-MCNC: 0.8 MG/DL (ref 0.5–1.4)
CTP QC/QA: YES
CTP QC/QA: YES
DIFFERENTIAL METHOD: NORMAL
EOSINOPHIL # BLD AUTO: 0.1 K/UL (ref 0–0.5)
EOSINOPHIL NFR BLD: 0.6 % (ref 0–8)
ERYTHROCYTE [DISTWIDTH] IN BLOOD BY AUTOMATED COUNT: 14.5 % (ref 11.5–14.5)
EST. GFR  (NO RACE VARIABLE): >60 ML/MIN/1.73 M^2
GLUCOSE SERPL-MCNC: 99 MG/DL (ref 70–110)
HCT VFR BLD AUTO: 44.2 % (ref 37–48.5)
HGB BLD-MCNC: 14.3 G/DL (ref 12–16)
IMM GRANULOCYTES # BLD AUTO: 0.02 K/UL (ref 0–0.04)
IMM GRANULOCYTES NFR BLD AUTO: 0.2 % (ref 0–0.5)
LYMPHOCYTES # BLD AUTO: 3.2 K/UL (ref 1–4.8)
LYMPHOCYTES NFR BLD: 32.1 % (ref 18–48)
MCH RBC QN AUTO: 28 PG (ref 27–31)
MCHC RBC AUTO-ENTMCNC: 32.4 G/DL (ref 32–36)
MCV RBC AUTO: 87 FL (ref 82–98)
MONOCYTES # BLD AUTO: 0.7 K/UL (ref 0.3–1)
MONOCYTES NFR BLD: 7 % (ref 4–15)
NEUTROPHILS # BLD AUTO: 6 K/UL (ref 1.8–7.7)
NEUTROPHILS NFR BLD: 59.8 % (ref 38–73)
NRBC BLD-RTO: 0 /100 WBC
PLATELET # BLD AUTO: 263 K/UL (ref 150–450)
PMV BLD AUTO: 9.9 FL (ref 9.2–12.9)
POC MOLECULAR INFLUENZA A AGN: NEGATIVE
POC MOLECULAR INFLUENZA B AGN: NEGATIVE
POTASSIUM SERPL-SCNC: 4.2 MMOL/L (ref 3.5–5.1)
PROT SERPL-MCNC: 8.2 G/DL (ref 6–8.4)
RBC # BLD AUTO: 5.11 M/UL (ref 4–5.4)
SARS-COV-2 RDRP RESP QL NAA+PROBE: NEGATIVE
SODIUM SERPL-SCNC: 142 MMOL/L (ref 136–145)
WBC # BLD AUTO: 10.03 K/UL (ref 3.9–12.7)

## 2023-10-29 PROCEDURE — 96375 TX/PRO/DX INJ NEW DRUG ADDON: CPT

## 2023-10-29 PROCEDURE — 99285 EMERGENCY DEPT VISIT HI MDM: CPT | Mod: 25

## 2023-10-29 PROCEDURE — 85025 COMPLETE CBC W/AUTO DIFF WBC: CPT | Performed by: STUDENT IN AN ORGANIZED HEALTH CARE EDUCATION/TRAINING PROGRAM

## 2023-10-29 PROCEDURE — 96361 HYDRATE IV INFUSION ADD-ON: CPT

## 2023-10-29 PROCEDURE — 87635 SARS-COV-2 COVID-19 AMP PRB: CPT | Performed by: STUDENT IN AN ORGANIZED HEALTH CARE EDUCATION/TRAINING PROGRAM

## 2023-10-29 PROCEDURE — 63600175 PHARM REV CODE 636 W HCPCS: Performed by: STUDENT IN AN ORGANIZED HEALTH CARE EDUCATION/TRAINING PROGRAM

## 2023-10-29 PROCEDURE — 80053 COMPREHEN METABOLIC PANEL: CPT | Performed by: STUDENT IN AN ORGANIZED HEALTH CARE EDUCATION/TRAINING PROGRAM

## 2023-10-29 PROCEDURE — 87502 INFLUENZA DNA AMP PROBE: CPT

## 2023-10-29 PROCEDURE — 25000003 PHARM REV CODE 250: Performed by: STUDENT IN AN ORGANIZED HEALTH CARE EDUCATION/TRAINING PROGRAM

## 2023-10-29 PROCEDURE — 96374 THER/PROPH/DIAG INJ IV PUSH: CPT

## 2023-10-29 RX ORDER — AMLODIPINE BESYLATE 5 MG/1
10 TABLET ORAL
Status: COMPLETED | OUTPATIENT
Start: 2023-10-29 | End: 2023-10-29

## 2023-10-29 RX ORDER — DIPHENHYDRAMINE HYDROCHLORIDE 50 MG/ML
25 INJECTION INTRAMUSCULAR; INTRAVENOUS
Status: COMPLETED | OUTPATIENT
Start: 2023-10-29 | End: 2023-10-29

## 2023-10-29 RX ORDER — METOCLOPRAMIDE HYDROCHLORIDE 5 MG/ML
10 INJECTION INTRAMUSCULAR; INTRAVENOUS
Status: COMPLETED | OUTPATIENT
Start: 2023-10-29 | End: 2023-10-29

## 2023-10-29 RX ORDER — KETOROLAC TROMETHAMINE 30 MG/ML
15 INJECTION, SOLUTION INTRAMUSCULAR; INTRAVENOUS
Status: COMPLETED | OUTPATIENT
Start: 2023-10-29 | End: 2023-10-29

## 2023-10-29 RX ADMIN — AMLODIPINE BESYLATE 10 MG: 5 TABLET ORAL at 08:10

## 2023-10-29 RX ADMIN — METOCLOPRAMIDE 10 MG: 5 INJECTION, SOLUTION INTRAMUSCULAR; INTRAVENOUS at 08:10

## 2023-10-29 RX ADMIN — KETOROLAC TROMETHAMINE 15 MG: 30 INJECTION, SOLUTION INTRAMUSCULAR; INTRAVENOUS at 08:10

## 2023-10-29 RX ADMIN — DIPHENHYDRAMINE HYDROCHLORIDE 25 MG: 50 INJECTION, SOLUTION INTRAMUSCULAR; INTRAVENOUS at 08:10

## 2023-10-29 RX ADMIN — SODIUM CHLORIDE, POTASSIUM CHLORIDE, SODIUM LACTATE AND CALCIUM CHLORIDE 1000 ML: 600; 310; 30; 20 INJECTION, SOLUTION INTRAVENOUS at 08:10

## 2023-10-30 NOTE — ED PROVIDER NOTES
Encounter Date: 10/29/2023       History     Chief Complaint   Patient presents with    Headache     Since this am no OTC taken, took her BP meds and an antibiotic she had from a prior sinus infection     63 year old female with history of hypertension with non-compliance presents with a headache. She says it has gradually worsened over the last few hours. No sudden in onset nor traumatic. She has history of headaches and this feels similar. No dysarthria, dysphagia, numbness/paresthesia. No nausea, vomiting, neck pain or neck stiffness. No falls or trauma. Patient was seen at her PCP today and was started on levofloxacin today.       Review of patient's allergies indicates:  No Known Allergies  Past Medical History:   Diagnosis Date    Hypertension      History reviewed. No pertinent surgical history.  Family History   Problem Relation Age of Onset    Breast cancer Neg Hx     Colon cancer Neg Hx     Ovarian cancer Neg Hx      Social History     Tobacco Use    Smoking status: Never    Smokeless tobacco: Never   Substance Use Topics    Alcohol use: Never    Drug use: No     Review of Systems    Physical Exam     Initial Vitals [10/29/23 1911]   BP Pulse Resp Temp SpO2   (!) 230/100 90 18 98.1 °F (36.7 °C) 98 %      MAP       --         Physical Exam    Nursing note and vitals reviewed.  Constitutional: She appears well-developed and well-nourished. She is not diaphoretic. No distress.   Phonating well, spontaneously protecting airway.   HENT:   Head: Normocephalic and atraumatic.   Eyes: EOM are normal. Pupils are equal, round, and reactive to light.   Neck: Neck supple. No JVD present.   Normal range of motion.  Cardiovascular:  Normal rate, regular rhythm, normal heart sounds and intact distal pulses.     Exam reveals no gallop and no friction rub.       No murmur heard.  Pulmonary/Chest: Breath sounds normal. No stridor. No respiratory distress. She has no wheezes. She has no rhonchi. She has no rales. She  exhibits no tenderness.   Abdominal: Abdomen is soft. She exhibits no mass. There is no abdominal tenderness. There is no rebound and no guarding.   Musculoskeletal:         General: No edema. Normal range of motion.      Cervical back: Normal range of motion and neck supple.     Neurological: She is alert and oriented to person, place, and time. She has normal strength and normal reflexes. She is not disoriented. She displays no atrophy and no tremor. No cranial nerve deficit or sensory deficit. She exhibits normal muscle tone. She displays a negative Romberg sign. She displays no seizure activity. Coordination and gait normal. GCS eye subscore is 4. GCS verbal subscore is 5. GCS motor subscore is 6.   Negative pronator drift.  Negative dysmetria.  Negative heel to shin.  No ataxic gait.  No facial droop.   Skin: Skin is warm and dry. Capillary refill takes less than 2 seconds.   Psychiatric: She has a normal mood and affect. Thought content normal.         ED Course   Procedures  Labs Reviewed   CBC W/ AUTO DIFFERENTIAL   COMPREHENSIVE METABOLIC PANEL   POCT INFLUENZA A/B MOLECULAR   SARS-COV-2 RDRP GENE          Imaging Results              CT Head Without Contrast (Final result)  Result time 10/29/23 20:28:24      Final result by Dora Montes MD (10/29/23 20:28:24)                   Impression:      No acute intracranial abnormality detected.      Electronically signed by: Dora Montes  Date:    10/29/2023  Time:    20:28               Narrative:    EXAMINATION:  CT OF THE HEAD WITHOUT    CLINICAL HISTORY:  headache;    TECHNIQUE:  5 mm unenhanced axial images were obtained from the skull base to the vertex.    COMPARISON:  None.    FINDINGS:  The ventricles, basal cisterns, and cortical sulci are within normal limits for patient's stated age. There is no acute intracranial hemorrhage, territorial infarct or mass effect, or midline shift. The visualized paranasal sinuses and mastoid air cells are  clear.                                       Medications   lactated ringers bolus 1,000 mL (1,000 mLs Intravenous New Bag 10/29/23 2039)   amLODIPine tablet 10 mg (10 mg Oral Given 10/29/23 2000)   metoclopramide HCl injection 10 mg (10 mg Intravenous Given 10/29/23 2041)   diphenhydrAMINE injection 25 mg (25 mg Intravenous Given 10/29/23 2041)   ketorolac injection 15 mg (15 mg Intravenous Given 10/29/23 2040)     Medical Decision Making  Hemodynamically stable. Afebrile. Phonating and protecting the airway spontaneously. No clinical evidence for cardiovascular instability or impending airway compromise. Examination as above. Additional historians include family at bedside. Prior medical records reviewed. Pt with history of hypertension, she admits to non-compliance. Current co-morbidities considered that will impact clinical decision making include as above.    Plan:  Labs, CTH, reglan, diphenhydramine, ketorolac, fluids. CTH is due to age, self-diagnosis of headaches.       Amount and/or Complexity of Data Reviewed  Labs: ordered.  Radiology: ordered.    Risk  Prescription drug management.               ED Course as of 10/29/23 2107   Sun Oct 29, 2023   2035 Cth reviewed.  [BG]   2106 Labs reviewed. CBC negative. CMP negative. Respiratory swabs negative. The patient was reassessed and on subsequent re-evaluation, they were subjectively feeling better. They were resting comfortably and in no acute distress. I discussed the laboratory and diagnostic findings with the patient. Education was provided and all questions were answered. As discussed, they were recommended to follow up with their primary care physician within the next few days and to return to the emergency department sooner for any new or worsening. They acknowledged and verbalized agreement to the treatment plan. The patient was discharged home in stable condition.     DISCLAIMER: This note was prepared with MModal voice recognition transcription  software. Garbled syntax, mangled pronouns, and other bizarre constructions may be attributed to that software system.     [BG]      ED Course User Index  [BG] Gray Lugo MD                    Clinical Impression:   Final diagnoses:  [R51.9] Nonintractable headache, unspecified chronicity pattern, unspecified headache type (Primary)        ED Disposition Condition    Discharge Stable          ED Prescriptions    None       Follow-up Information       Follow up With Specialties Details Why Contact Info    [facility] Emergency Dept Emergency Medicine Go to  As needed [address] [facility] [address] [phone]             Gray Lugo MD  10/29/23 9067

## 2023-10-30 NOTE — DISCHARGE INSTRUCTIONS

## 2023-10-30 NOTE — ED TRIAGE NOTES
Pt presents to ER with c/o HA that started this AM. Pt states it in the front of her entire face. Pt states she took b/p meds and antibx for HA. Pt rates pain 9/10 at this time. Pt says her eyes hurt a little but denies any other issues at this time. Pt AAOx4.

## 2024-08-23 ENCOUNTER — HOSPITAL ENCOUNTER (EMERGENCY)
Facility: HOSPITAL | Age: 64
Discharge: HOME | End: 2024-08-23
Attending: STUDENT IN AN ORGANIZED HEALTH CARE EDUCATION/TRAINING PROGRAM
Payer: COMMERCIAL

## 2024-08-23 VITALS
HEIGHT: 66 IN | SYSTOLIC BLOOD PRESSURE: 180 MMHG | TEMPERATURE: 98 F | RESPIRATION RATE: 19 BRPM | BODY MASS INDEX: 38.57 KG/M2 | DIASTOLIC BLOOD PRESSURE: 88 MMHG | WEIGHT: 240 LBS | OXYGEN SATURATION: 97 % | HEART RATE: 72 BPM

## 2024-08-23 DIAGNOSIS — M79.661 RIGHT CALF PAIN: ICD-10-CM

## 2024-08-23 DIAGNOSIS — M71.21 SYNOVIAL CYST OF RIGHT POPLITEAL SPACE: Primary | ICD-10-CM

## 2024-08-23 PROCEDURE — 25000003 PHARM REV CODE 250: Performed by: STUDENT IN AN ORGANIZED HEALTH CARE EDUCATION/TRAINING PROGRAM

## 2024-08-23 PROCEDURE — 99284 EMERGENCY DEPT VISIT MOD MDM: CPT | Mod: 25

## 2024-08-23 RX ORDER — IBUPROFEN 600 MG/1
600 TABLET ORAL
Status: DISCONTINUED | OUTPATIENT
Start: 2024-08-23 | End: 2024-08-24 | Stop reason: HOSPADM

## 2024-08-24 NOTE — DISCHARGE INSTRUCTIONS

## 2024-08-24 NOTE — ED PROVIDER NOTES
Encounter Date: 8/23/2024       History     Chief Complaint   Patient presents with    Leg Pain     Pt presents to ER with complaints right leg pain since last night. Pt rates pain 9/10 and says it goes from her calf to her foot. Pt states she took Tylenol at 1000 and had not had any relief. Pt states she was able to go to work today but now the pain is worse. Pt denies SOB, chest pain and any other issues at this time. Pt denies any hx of clots or being on blood thinners.      63-year-old female with history of bilateral knee arthritis, right leg Baker's cyst, presents now for right calf pain, throbbing, worse with movement, worse with standing for long time, ongoing since last night.  She awoke from sleep to use the restroom, felt knee pain that started walking and felt pain in the back of her calf.  It has been ongoing since then.  He was difficult for her to work today, where she has to stand, and she became concerned about a blood clot.  She denies any redness or swelling of the right lower extremity.  She has no history of blood clots.  She tried Tylenol with minimal relief.      Review of patient's allergies indicates:  No Known Allergies  Past Medical History:   Diagnosis Date    Hypertension      No past surgical history on file.  Family History   Problem Relation Name Age of Onset    Breast cancer Neg Hx      Colon cancer Neg Hx      Ovarian cancer Neg Hx       Social History     Tobacco Use    Smoking status: Never    Smokeless tobacco: Never   Substance Use Topics    Alcohol use: Never    Drug use: No     Review of Systems   Constitutional:  Negative for activity change, appetite change, chills, fever and unexpected weight change.   HENT:  Negative for dental problem and drooling.    Eyes:  Negative for discharge and itching.   Respiratory:  Negative for cough, chest tightness, shortness of breath, wheezing and stridor.    Cardiovascular:  Negative for chest pain, palpitations and leg swelling.    Gastrointestinal:  Negative for abdominal distention, abdominal pain, diarrhea and nausea.   Genitourinary:  Negative for difficulty urinating, dysuria, frequency and urgency.   Musculoskeletal:  Negative for back pain, gait problem and joint swelling.   Neurological:  Negative for dizziness, syncope, numbness and headaches.   Psychiatric/Behavioral:  Negative for agitation, behavioral problems and confusion.        Physical Exam     Initial Vitals [08/23/24 2024]   BP Pulse Resp Temp SpO2   (!) 192/84 80 18 98.6 °F (37 °C) 100 %      MAP       --         Physical Exam    ED Course   Procedures  Labs Reviewed - No data to display       Imaging Results              US Lower Extremity Veins Right (Final result)  Result time 08/23/24 21:22:55      Final result by Clarissa Coulter MD (08/23/24 21:22:55)                   Impression:      No evidence of right lower extremity deep venous thrombosis.    Small right Baker's cyst.      Electronically signed by: Clarissa Coulter MD  Date:    08/23/2024  Time:    21:22               Narrative:    EXAMINATION:  US LOWER EXTREMITY VEINS RIGHT    CLINICAL HISTORY:  Pain in right lower leg    TECHNIQUE:  Duplex and color flow Doppler evaluation of the right lower extremity veins was performed.    COMPARISON:  None    FINDINGS:  No evidence of clot involving the bilateral common femoral veins or right greater saphenous, femoral, popliteal, peroneal, anterior and posterior tibial veins.  All venous structures demonstrate normal respiratory phasicity and augment adequately.  Small right Baker's cyst is seen measuring 3 x 2 x 2 cm.                                       Medications - No data to display    Medical Decision Making  63-year-old female presents for 24 hours of right posterior calf pain.  Vitals here normal.  Differential diagnosis includes DVT, cellulitis, Baker's cyst, peripheral arterial disease, vascular insufficiency.  Patient was normal 2+ pulses in the dorsalis  pedis, doubt PID.  She has normal cap refill.  Leg exam without evidence of cellulitis, no redness or swelling.  She does have tenderness over the right popliteal fossa and ultrasound of the leg showed small Baker's cyst, likely the source of her pain.  Could also be a muscle strain.  There is no evidence of a DVT.  She is stable for discharge with outpatient follow-up and return precautions for worsening symptoms.  She is comfortable with the plan and ready to go home.    Risk  Prescription drug management.                                      Clinical Impression:  Final diagnoses:  [M79.661] Right calf pain  [M71.21] Synovial cyst of right popliteal space (Primary)          ED Disposition Condition    Discharge Stable          ED Prescriptions    None       Follow-up Information       Follow up With Specialties Details Why Contact Info    Linus Leo MD Family Medicine Call in 1 day To set up a follow-up appointment, To recheck today's symptoms [address] [place] [address]  [phone]               Chuy Hutchinson MD  08/24/24 [id number]

## 2025-01-05 ENCOUNTER — OFFICE VISIT (OUTPATIENT)
Dept: URGENT CARE | Facility: CLINIC | Age: 65
End: 2025-01-05
Payer: COMMERCIAL

## 2025-01-05 VITALS
SYSTOLIC BLOOD PRESSURE: 170 MMHG | HEART RATE: 80 BPM | BODY MASS INDEX: 39.72 KG/M2 | TEMPERATURE: 98 F | HEIGHT: 66 IN | RESPIRATION RATE: 16 BRPM | DIASTOLIC BLOOD PRESSURE: 108 MMHG | WEIGHT: 247.13 LBS | OXYGEN SATURATION: 97 %

## 2025-01-05 DIAGNOSIS — J06.9 VIRAL URI: Primary | ICD-10-CM

## 2025-01-05 DIAGNOSIS — R05.9 COUGH, UNSPECIFIED TYPE: ICD-10-CM

## 2025-01-05 LAB
CTP QC/QA: YES
CTP QC/QA: YES
POC MOLECULAR INFLUENZA A AGN: NEGATIVE
POC MOLECULAR INFLUENZA B AGN: NEGATIVE
SARS-COV-2 AG RESP QL IA.RAPID: NEGATIVE

## 2025-01-05 PROCEDURE — 87502 INFLUENZA DNA AMP PROBE: CPT | Mod: QW,S$GLB,, | Performed by: NURSE PRACTITIONER

## 2025-01-05 RX ORDER — IPRATROPIUM BROMIDE 21 UG/1
2 SPRAY, METERED NASAL 2 TIMES DAILY
Qty: 30 ML | Refills: 0 | Status: SHIPPED | OUTPATIENT
Start: 2025-01-05 | End: 2025-01-12

## 2025-01-05 RX ORDER — BENZONATATE 100 MG/1
200 CAPSULE ORAL 3 TIMES DAILY PRN
Qty: 60 CAPSULE | Refills: 0 | Status: SHIPPED | OUTPATIENT
Start: 2025-01-05 | End: 2025-01-15

## 2025-01-05 RX ORDER — SPIRONOLACTONE 25 MG/1
1 TABLET ORAL DAILY
COMMUNITY
Start: 2024-04-11

## 2025-01-05 RX ORDER — GUAIFENESIN 600 MG/1
1200 TABLET, EXTENDED RELEASE ORAL 2 TIMES DAILY
Qty: 40 TABLET | Refills: 0 | Status: SHIPPED | OUTPATIENT
Start: 2025-01-05 | End: 2025-01-15

## 2025-01-05 NOTE — PATIENT INSTRUCTIONS
Discharge instructions for Viral URI and Cough   Push fluids maintain hydration  Start Tessalon Perles at home for cough patient has Phenergan DM at home    OTC Coricidin can be used for patients with hypertension and palpitations because you cannot use ingredients such as pseudoephedrine and phenylephrine for oral decongestants.  Coricidin HBP Cough & Cold (Chlorpheniramine/Dextromethorphan)  Coricidin HBP Maximum Strength Multi-Symptom Flu  (Acetaminophen,Dextromethorphan, Chlorpheniramine)  Coricidin HBP® Maximum Strength Cold & Flu Day/Night (Acetaminophen,Dextromethorphan, Doxylamine, Guaifenesin)  Coricidin HBP Chest Congestion & Cough (Dextromethorphan + Guaifenesin)    Medications that control cough are suppressants and expectorants. Suppressants are tessalon pearls and dextromethorphan. If you have a productive cough with sputum, you need an expectorant called guaifenesin.     - Rest.    - Drink plenty of fluids.  - Viral upper respiratory infections typically run their course in 10-14 days.     - Tylenol (acetaminophen) or Ibuprofen as directed as needed for fever/pain. Avoid tylenol if you have a history of liver disease. Do not take ibuprofen if you have a history of GI bleeding, kidney disease, gastric surgery, or if you take blood thinners.     - You can take over-the-counter claritin, zyrtec, allegra, or xyzal as directed. These are antihistamines that can help with runny nose, nasal congestion, sneezing, and helps to dry up post-nasal drip, which usually causes sore throat and cough.   - If you do NOT have high blood pressure, you may use a decongestant form (D)  of this medication (ie. Claritin- D, zyrtec-D, allegra-D) or if you do not take the D form, you can take sudafed (pseudoephedrine) over the counter, which is a decongestant. Do NOT take two decongestant (D) medications at the same time (such as mucinex-D and claritin-D or plain sudafed and claritin D)    - You can use Flonase (fluticasone)  nasal spray as directed for sinus congestion and postnasal drip. This is a steroid nasal spray that works locally over time to decrease the inflammation in your nose/sinuses and help with allergic symptoms. This is not an quick- relief spray like afrin, but it works well if used daily.  Discontinue if you develop nose bleed  - use nasal saline prior to Flonase.  - Use Ocean Spray Nasal Saline 1-3 puffs each nostril every 2-3 hours then blow out onto tissue. This is to irrigate the nasal passage way to clear the sinus openings. Use until sinus problem resolved.    - you can take plain Mucinex (guaifenesin) 1200 mg twice a day to help loosen mucous.     -warm salt water gargles can help with sore throat    - warm tea with honey can help with cough. Honey is a natural cough suppressant.    - Dextromethorphan (DM) is a cough suppressant over the counter (ie. mucinex DM, robitussin, delsym; dayquil/nyquil has DM as well.)    - Follow up with your PCP or specialty clinic as directed in the next 1-2 weeks if not improved or as needed.  You can call [phone] to schedule an appointment with the appropriate provider.      - Go to the ER if you develop new or worsening symptoms.     - You must understand that you have received an Urgent Care treatment only and that you may be released before all of your medical problems are known or treated.   - You, the patient, will arrange for follow up care as instructed.   - If your condition worsens or fails to improve we recommend that you receive another evaluation at the ER immediately or contact your PCP to discuss your concerns or return here.

## 2025-01-05 NOTE — PROGRESS NOTES
"Subjective:      Patient ID: Shaylee Carrasco is a 64 y.o. female.    Vitals:  height is 5' 6" (1.676 m) and weight is 112.1 kg (247 lb 2.2 oz). Her oral temperature is 98.2 °F (36.8 °C). Her blood pressure is 170/108 (abnormal) and her pulse is 80. Her respiration is 16 and oxygen saturation is 97%.     Chief Complaint: Sinus Problem    Patient complains of congestion ,pain to both ear and weakness, runny nose, nasal drip that started 3 days ago. Patient took coricidin and tylenol.  Patient reports she did not take her blood pressure medication this morning and will take it once she returns home.  Patient reports she does not need a refill for her blood pressure medication.    Sinus Problem  This is a new problem. The current episode started in the past 7 days. The problem is unchanged. There has been no fever. Associated symptoms include congestion, coughing and ear pain. Pertinent negatives include no chills, diaphoresis, headaches, neck pain, shortness of breath or sore throat. Past treatments include oral decongestants and acetaminophen. The treatment provided no relief.       Constitution: Negative for chills, sweating, fatigue and fever.   HENT:  Positive for ear pain and congestion. Negative for sore throat.    Neck: Negative for neck pain and neck stiffness.   Cardiovascular:  Negative for chest pain, leg swelling, palpitations and sob on exertion.   Eyes:  Negative for eye pain, eye redness and vision loss.   Respiratory:  Positive for cough. Negative for sputum production and shortness of breath.    Gastrointestinal:  Negative for abdominal pain, nausea, vomiting and diarrhea.   Genitourinary:  Negative for dysuria, frequency, urgency, flank pain and hematuria.   Musculoskeletal:  Negative for pain and trauma.   Skin:  Negative for color change and rash.   Neurological:  Negative for dizziness, headaches and disorientation.   Psychiatric/Behavioral:  Negative for disorientation.       Objective: "     Physical Exam   Constitutional: She is oriented to person, place, and time. She appears well-developed. She is cooperative.  Non-toxic appearance. She does not appear ill. No distress. awake  HENT:   Head: Normocephalic and atraumatic.   Ears:   Right Ear: Hearing, tympanic membrane, external ear and ear canal normal. no impacted cerumen  Left Ear: Hearing, tympanic membrane, external ear and ear canal normal. no impacted cerumen  Nose: Mucosal edema, rhinorrhea and congestion present. No nasal deformity. No epistaxis. Right sinus exhibits no maxillary sinus tenderness and no frontal sinus tenderness. Left sinus exhibits no maxillary sinus tenderness and no frontal sinus tenderness.   Mouth/Throat: Uvula is midline and mucous membranes are normal. Mucous membranes are moist. No trismus in the jaw. Normal dentition. No uvula swelling. Posterior oropharyngeal erythema present. No oropharyngeal exudate or posterior oropharyngeal edema.   Eyes: Conjunctivae and lids are normal. Pupils are equal, round, and reactive to light. No scleral icterus. Extraocular movement intact   Neck: Trachea normal and phonation normal. Neck supple. No thyromegaly present. No edema present. No erythema present. No neck rigidity present.   Cardiovascular: Normal rate, regular rhythm, S1 normal, normal heart sounds and normal pulses.   Pulmonary/Chest: Effort normal and breath sounds normal. No respiratory distress. She has no decreased breath sounds. She has no wheezes. She has no rhonchi. She has no rales.   Abdominal: Normal appearance and bowel sounds are normal. Soft. flat abdomen   Genitourinary:    Vulva normal.     Musculoskeletal: Normal range of motion.         General: No deformity. Normal range of motion.      Right lower leg: No edema.      Left lower leg: No edema.   Lymphadenopathy:     She has no cervical adenopathy.   Neurological: no focal deficit. She is alert and oriented to person, place, and time. She exhibits normal  muscle tone. Coordination normal.   Skin: Skin is warm, dry, intact, not diaphoretic and not pale. Capillary refill takes less than 2 seconds.   Psychiatric: Her speech is normal and behavior is normal. Mood, judgment and thought content normal.   Nursing note and vitals reviewed.    Results for orders placed or performed in visit on 01/05/25   POCT Influenza A/B MOLECULAR    Collection Time: 01/05/25 11:39 AM   Result Value Ref Range    POC Molecular Influenza A Ag Negative Negative    POC Molecular Influenza B Ag Negative Negative     Acceptable Yes    SARS Coronavirus 2 Antigen, POCT Manual Read    Collection Time: 01/05/25 12:15 PM   Result Value Ref Range    SARS Coronavirus 2 Antigen Negative Negative     Acceptable Yes            Assessment:     1. Viral URI    2. Cough, unspecified type      Discussed with patient viral upper respiratory discharge instructions.  Advised patient to start to use Mucinex to help decongest Tessalon Perles as needed for cough ipratropium to help decrease inflammation and swelling to nasal passages discussed with patient blood pressure concerns again blood pressure medication will be taken when patient returns home she has all the medicines she needs. Discussed  with patient over-the-counter Coricidin products that can be used to help you to treat symptoms  and safe for patients with hypertension.  Plan:       Viral URI  -     benzonatate (TESSALON) 100 MG capsule; Take 2 capsules (200 mg total) by mouth 3 (three) times daily as needed for Cough.  Dispense: 60 capsule; Refill: 0  -     ipratropium (ATROVENT) 21 mcg (0.03 %) nasal spray; 2 sprays by Each Nostril route 2 (two) times daily. for 7 days  Dispense: 30 mL; Refill: 0  -     guaiFENesin (MUCINEX) 600 mg 12 hr tablet; Take 2 tablets (1,200 mg total) by mouth 2 (two) times daily. for 10 days  Dispense: 40 tablet; Refill: 0    Cough, unspecified type  -     POCT Influenza A/B MOLECULAR  -      SARS Coronavirus 2 Antigen, POCT Manual Read        Patient Instructions   Discharge instructions for Viral URI and Cough   Push fluids maintain hydration  Start Tessalon Perles at home for cough patient has Phenergan DM at home    OTC Coricidin can be used for patients with hypertension and palpitations because you cannot use ingredients such as pseudoephedrine and phenylephrine for oral decongestants.  Coricidin HBP Cough & Cold (Chlorpheniramine/Dextromethorphan)  Coricidin HBP Maximum Strength Multi-Symptom Flu  (Acetaminophen,Dextromethorphan, Chlorpheniramine)  Coricidin HBP® Maximum Strength Cold & Flu Day/Night (Acetaminophen,Dextromethorphan, Doxylamine, Guaifenesin)  Coricidin HBP Chest Congestion & Cough (Dextromethorphan + Guaifenesin)    Medications that control cough are suppressants and expectorants. Suppressants are tessalon pearls and dextromethorphan. If you have a productive cough with sputum, you need an expectorant called guaifenesin.     - Rest.    - Drink plenty of fluids.  - Viral upper respiratory infections typically run their course in 10-14 days.     - Tylenol (acetaminophen) or Ibuprofen as directed as needed for fever/pain. Avoid tylenol if you have a history of liver disease. Do not take ibuprofen if you have a history of GI bleeding, kidney disease, gastric surgery, or if you take blood thinners.     - You can take over-the-counter claritin, zyrtec, allegra, or xyzal as directed. These are antihistamines that can help with runny nose, nasal congestion, sneezing, and helps to dry up post-nasal drip, which usually causes sore throat and cough.   - If you do NOT have high blood pressure, you may use a decongestant form (D)  of this medication (ie. Claritin- D, zyrtec-D, allegra-D) or if you do not take the D form, you can take sudafed (pseudoephedrine) over the counter, which is a decongestant. Do NOT take two decongestant (D) medications at the same time (such as mucinex-D and  claritin-D or plain sudafed and claritin D)    - You can use Flonase (fluticasone) nasal spray as directed for sinus congestion and postnasal drip. This is a steroid nasal spray that works locally over time to decrease the inflammation in your nose/sinuses and help with allergic symptoms. This is not an quick- relief spray like afrin, but it works well if used daily.  Discontinue if you develop nose bleed  - use nasal saline prior to Flonase.  - Use Ocean Spray Nasal Saline 1-3 puffs each nostril every 2-3 hours then blow out onto tissue. This is to irrigate the nasal passage way to clear the sinus openings. Use until sinus problem resolved.    - you can take plain Mucinex (guaifenesin) 1200 mg twice a day to help loosen mucous.     -warm salt water gargles can help with sore throat    - warm tea with honey can help with cough. Honey is a natural cough suppressant.    - Dextromethorphan (DM) is a cough suppressant over the counter (ie. mucinex DM, robitussin, delsym; dayquil/nyquil has DM as well.)    - Follow up with your PCP or specialty clinic as directed in the next 1-2 weeks if not improved or as needed.  You can call [phone] to schedule an appointment with the appropriate provider.      - Go to the ER if you develop new or worsening symptoms.     - You must understand that you have received an Urgent Care treatment only and that you may be released before all of your medical problems are known or treated.   - You, the patient, will arrange for follow up care as instructed.   - If your condition worsens or fails to improve we recommend that you receive another evaluation at the ER immediately or contact your PCP to discuss your concerns or return here.

## 2025-01-05 NOTE — LETTER
January 5, 2025      [facility]  [address], [address]  [place] [address]  Phone: [phone]  Fax: [phone]       Patient: Shaylee Carrasco   YOB: 1960  Date of Visit: 01/05/2025    To Whom It May Concern:    Nathanael Carrasco  was at [facility] on 01/05/2025. The patient may return to work/school on 01/07/2025 with no restrictions. If you have any questions or concerns, or if I can be of further assistance, please do not hesitate to contact me.    Sincerely,    Kenyetta Pelayo, DNP

## 2025-03-03 ENCOUNTER — HOSPITAL ENCOUNTER (EMERGENCY)
Facility: HOSPITAL | Age: 65
Discharge: LEFT BEFORE DISCHARGE | End: 2025-03-03
Attending: EMERGENCY MEDICINE
Payer: COMMERCIAL

## 2025-03-03 VITALS
HEART RATE: 84 BPM | TEMPERATURE: 98 F | RESPIRATION RATE: 17 BRPM | DIASTOLIC BLOOD PRESSURE: 87 MMHG | BODY MASS INDEX: 36.96 KG/M2 | WEIGHT: 230 LBS | HEIGHT: 66 IN | OXYGEN SATURATION: 99 % | SYSTOLIC BLOOD PRESSURE: 182 MMHG

## 2025-03-03 DIAGNOSIS — Z53.21 ELOPED FROM EMERGENCY DEPARTMENT: Primary | ICD-10-CM

## 2025-03-03 LAB
BASOPHILS # BLD AUTO: 0.01 K/UL (ref 0–0.2)
BASOPHILS NFR BLD: 0.1 % (ref 0–1.9)
DIFFERENTIAL METHOD BLD: NORMAL
EOSINOPHIL # BLD AUTO: 0 K/UL (ref 0–0.5)
EOSINOPHIL NFR BLD: 0.5 % (ref 0–8)
ERYTHROCYTE [DISTWIDTH] IN BLOOD BY AUTOMATED COUNT: 14 % (ref 11.5–14.5)
HCT VFR BLD AUTO: 44.1 % (ref 37–48.5)
HGB BLD-MCNC: 14.6 G/DL (ref 12–16)
IMM GRANULOCYTES # BLD AUTO: 0.01 K/UL (ref 0–0.04)
IMM GRANULOCYTES NFR BLD AUTO: 0.1 % (ref 0–0.5)
LYMPHOCYTES # BLD AUTO: 2.9 K/UL (ref 1–4.8)
LYMPHOCYTES NFR BLD: 35.6 % (ref 18–48)
MCH RBC QN AUTO: 28.5 PG (ref 27–31)
MCHC RBC AUTO-ENTMCNC: 33.1 G/DL (ref 32–36)
MCV RBC AUTO: 86 FL (ref 82–98)
MONOCYTES # BLD AUTO: 0.6 K/UL (ref 0.3–1)
MONOCYTES NFR BLD: 6.7 % (ref 4–15)
NEUTROPHILS # BLD AUTO: 4.7 K/UL (ref 1.8–7.7)
NEUTROPHILS NFR BLD: 57 % (ref 38–73)
NRBC BLD-RTO: 0 /100 WBC
PLATELET # BLD AUTO: 289 K/UL (ref 150–450)
PMV BLD AUTO: 9.7 FL (ref 9.2–12.9)
RBC # BLD AUTO: 5.13 M/UL (ref 4–5.4)
WBC # BLD AUTO: 8.23 K/UL (ref 3.9–12.7)

## 2025-03-03 PROCEDURE — 85025 COMPLETE CBC W/AUTO DIFF WBC: CPT | Performed by: NURSE PRACTITIONER

## 2025-03-03 PROCEDURE — 99283 EMERGENCY DEPT VISIT LOW MDM: CPT

## 2025-03-03 NOTE — ED PROVIDER NOTES
Encounter Date: 3/3/2025       History     Chief Complaint   Patient presents with    Bleeding     Dark red with clots since this morning. States started after BM and straining. Unsure it is coming from vagina or rectum. States hs changes pad twice today. Denies use of blood thinners, denies dizziness/weakness. Hx hemorrhoids.      Patient eloped after triage prior to being room for evaluation by ED provider.        Review of patient's allergies indicates:  No Known Allergies  Past Medical History:   Diagnosis Date    Hypertension      No past surgical history on file.  Family History   Problem Relation Name Age of Onset    Breast cancer Neg Hx      Colon cancer Neg Hx      Ovarian cancer Neg Hx       Social History[1]  Review of Systems    Physical Exam     Initial Vitals [03/03/25 1111]   BP Pulse Resp Temp SpO2   (!) 182/87 84 17 98.4 °F (36.9 °C) 99 %      MAP       --         Physical Exam    ED Course   Procedures  Labs Reviewed   CBC W/ AUTO DIFFERENTIAL       Result Value    WBC 8.23      RBC 5.13      Hemoglobin 14.6      Hematocrit 44.1      MCV 86      MCH 28.5      MCHC 33.1      RDW 14.0      Platelets 289      MPV 9.7      Immature Granulocytes 0.1      Gran # (ANC) 4.7      Immature Grans (Abs) 0.01      Lymph # 2.9      Mono # 0.6      Eos # 0.0      Baso # 0.01      nRBC 0      Gran % 57.0      Lymph % 35.6      Mono % 6.7      Eosinophil % 0.5      Basophil % 0.1      Differential Method Automated     URINALYSIS, REFLEX TO URINE CULTURE   COMPREHENSIVE METABOLIC PANEL   POCT OCCULT BLOOD STOOL   TYPE & SCREEN          Imaging Results    None          Medications - No data to display  Medical Decision Making                                    Clinical Impression:  Final diagnoses:  [Z53.21] Eloped from emergency department (Primary)          ED Disposition Condition    Eloped Stable                  [1]   Social History  Tobacco Use    Smoking status: Never    Smokeless tobacco: Never   Substance  Use Topics    Alcohol use: Never    Drug use: No        Romeo Pena PA-C  03/03/25 9820

## 2025-03-03 NOTE — ED NOTES
Pt not willing to wait to be roomed. Pt insisted to get iv removed. Pt continued to walk to exit. Pa notified.

## 2025-07-12 ENCOUNTER — OFFICE VISIT (OUTPATIENT)
Dept: URGENT CARE | Facility: CLINIC | Age: 65
End: 2025-07-12
Payer: COMMERCIAL

## 2025-07-12 VITALS
TEMPERATURE: 98 F | DIASTOLIC BLOOD PRESSURE: 92 MMHG | BODY MASS INDEX: 39.76 KG/M2 | HEART RATE: 80 BPM | HEIGHT: 66 IN | SYSTOLIC BLOOD PRESSURE: 177 MMHG | WEIGHT: 247.38 LBS | OXYGEN SATURATION: 98 % | RESPIRATION RATE: 20 BRPM

## 2025-07-12 DIAGNOSIS — U07.1 COVID-19 VIRUS DETECTED: ICD-10-CM

## 2025-07-12 DIAGNOSIS — U07.1 COVID-19: Primary | ICD-10-CM

## 2025-07-12 DIAGNOSIS — R05.1 ACUTE COUGH: ICD-10-CM

## 2025-07-12 LAB
CTP QC/QA: YES
SARS-COV+SARS-COV-2 AG RESP QL IA.RAPID: POSITIVE

## 2025-07-12 PROCEDURE — 99213 OFFICE O/P EST LOW 20 MIN: CPT | Mod: S$GLB,,, | Performed by: FAMILY MEDICINE

## 2025-07-12 PROCEDURE — 87811 SARS-COV-2 COVID19 W/OPTIC: CPT | Mod: QW,S$GLB,, | Performed by: FAMILY MEDICINE

## 2025-07-12 RX ORDER — MELOXICAM 7.5 MG/1
1 TABLET ORAL DAILY
COMMUNITY

## 2025-07-12 RX ORDER — GUAIFENESIN 600 MG/1
1200 TABLET, EXTENDED RELEASE ORAL 2 TIMES DAILY
Qty: 40 TABLET | Refills: 0 | Status: SHIPPED | OUTPATIENT
Start: 2025-07-12 | End: 2025-07-22

## 2025-07-12 RX ORDER — AMLODIPINE BESYLATE 5 MG/1
5 TABLET ORAL
COMMUNITY
Start: 2025-05-30 | End: 2025-08-28

## 2025-07-12 RX ORDER — CETIRIZINE HYDROCHLORIDE 10 MG/1
10 TABLET ORAL DAILY
Qty: 30 TABLET | Refills: 0 | Status: SHIPPED | OUTPATIENT
Start: 2025-07-12 | End: 2025-08-11

## 2025-07-12 RX ORDER — IBUPROFEN 800 MG/1
800 TABLET, FILM COATED ORAL EVERY 6 HOURS PRN
COMMUNITY
Start: 2025-02-24

## 2025-07-12 RX ORDER — ERGOCALCIFEROL 1.25 MG/1
1 CAPSULE ORAL
COMMUNITY
Start: 2024-09-30

## 2025-07-12 RX ORDER — FLUTICASONE PROPIONATE 50 MCG
1 SPRAY, SUSPENSION (ML) NASAL DAILY
Qty: 16 G | Refills: 0 | Status: SHIPPED | OUTPATIENT
Start: 2025-07-12

## 2025-07-12 NOTE — PROGRESS NOTES
"Subjective:      Patient ID: Shaylee Carrasco is a 64 y.o. female.    Vitals:  height is 5' 6" (1.676 m) and weight is 112.2 kg (247 lb 5.7 oz). Her oral temperature is 98.1 °F (36.7 °C). Her blood pressure is 177/92 (abnormal) and her pulse is 80. Her respiration is 20 and oxygen saturation is 98%.     Chief Complaint: Cough    Pt presents with cough, congestion, runny nose and headache. Pt states symptoms started Thursday. Pt states she just returned from  vacation in [place]. No fever or chills. No cp or SOB. No GI related symptoms, including, N/v/D or constipation. No anosmia or ageusia. Hx of cv in 2021..       Cough  This is a new problem. The current episode started in the past 7 days (2 days). The problem has been unchanged. The problem occurs constantly. The cough is Productive of sputum. Associated symptoms include ear congestion, headaches, nasal congestion, postnasal drip and rhinorrhea. Pertinent negatives include no chest pain, chills, ear pain, fever, heartburn, hemoptysis, myalgias, rash, sore throat, shortness of breath, sweats, weight loss or wheezing. Nothing aggravates the symptoms. Treatments tried: coriciden , tylenol athritis, airborne vit c. The treatment provided no relief. Her past medical history is significant for pneumonia. There is no history of asthma, bronchiectasis, bronchitis, COPD, emphysema or environmental allergies.       Constitution: Negative for activity change, appetite change, chills, sweating, fatigue, fever, unexpected weight change and generalized weakness.   HENT:  Positive for postnasal drip. Negative for ear pain and sore throat.    Cardiovascular:  Negative for chest pain.   Respiratory:  Positive for cough. Negative for chest tightness, sputum production, bloody sputum, shortness of breath and wheezing.    Gastrointestinal:  Negative for heartburn.   Musculoskeletal:  Negative for muscle ache.   Skin:  Negative for rash.   Allergic/Immunologic: Negative for " environmental allergies.   Neurological:  Positive for headaches.      Objective:     Physical Exam   Constitutional: She is oriented to person, place, and time. She appears well-developed.  Non-toxic appearance. She does not appear ill. No distress.   HENT:   Head: Normocephalic and atraumatic.   Ears:   Right Ear: External ear normal.   Left Ear: External ear normal.   Nose: Nose normal.   Mouth/Throat: Oropharynx is clear and moist.   Eyes: Conjunctivae, EOM and lids are normal. Pupils are equal, round, and reactive to light.   Neck: Trachea normal and phonation normal. Neck supple.   Musculoskeletal: Normal range of motion.         General: Normal range of motion.   Neurological: She is alert and oriented to person, place, and time.   Skin: Skin is warm, dry, intact and not diaphoretic.   Psychiatric: Her speech is normal and behavior is normal. Judgment and thought content normal.   Nursing note and vitals reviewed.    Results for orders placed or performed in visit on 07/12/25   SARS Coronavirus 2 Antigen, POCT Manual Read    Collection Time: 07/12/25 12:52 PM   Result Value Ref Range    SARS Coronavirus 2 Antigen Positive (A) Negative, Presumptive Negative     Acceptable Yes       Assessment:     1. COVID-19    2. Acute cough        Plan:     Cv pos, lungs ctab, vss, pax reviewed, she would like to proceed    Discussed results/diagnosis/plan with patient in clinic. Strict precautions given to patient to monitor for worsening signs and symptoms. Advised to follow up with PCP or specialist.    Explained side effects of medications prescribed with patient and informed him/her to discontinue use if he/she has any side effects and to inform UC or PCP if this occurs. All questions answered. Strict ED verses clinic return precautions stressed and given in depth. Advised if symptoms worsens of fail to improve he/she should go to the Emergency Room. Discharge and follow-up instructions given  verbally/printed with the patient who expressed understanding and willingness to comply with my recommendations. Patient voiced understanding and in agreement with current treatment plan. Patient exits the exam room in no acute distress. Conversant and engaged during discharge discussion, verbalized understanding.      COVID-19  -     nirmatrelvir-ritonavir 300 mg (150 mg x 2)-100 mg copackaged tablets (EUA); Take 3 tablets by mouth 2 (two) times daily for 5 days. Each dose contains 2 nirmatrelvir (pink tablets) and 1 ritonavir (white tablet). Take all 3 tablets together  Dispense: 30 tablet; Refill: 0    Acute cough  -     SARS Coronavirus 2 Antigen, POCT Manual Read  -     guaiFENesin (MUCINEX) 600 mg 12 hr tablet; Take 2 tablets (1,200 mg total) by mouth 2 (two) times daily. for 10 days  Dispense: 40 tablet; Refill: 0  -     cetirizine (ZYRTEC) 10 MG tablet; Take 1 tablet (10 mg total) by mouth once daily.  Dispense: 30 tablet; Refill: 0  -     fluticasone propionate (FLONASE) 50 mcg/actuation nasal spray; 1 spray (50 mcg total) by Each Nostril route once daily.  Dispense: 16 g; Refill: 0

## 2025-07-12 NOTE — PATIENT INSTRUCTIONS
General Discharge Instructions   PLEASE READ YOUR DISCHARGE INSTRUCTIONS ENTIRELY AS IT CONTAINS IMPORTANT INFORMATION.  If you were prescribed a narcotic or controlled medication, do not drive or operate heavy equipment or machinery while taking these medications.  If you were prescribed antibiotics, please take them to completion.  You must understand that you've received an Urgent Care treatment only and that you may be released before all your medical problems are known or treated. You, the patient, will arrange for follow up care as instructed.    OVER THE COUNTER RECOMMENDATIONS/SUGGESTIONS.    Make sure to stay well hydrated.    Use Nasal Saline to mechanically move any post nasal drip from your eustachian tube or from the back of your throat.    Use warm salt water gargles to ease your throat pain. Warm salt water gargles as needed for sore throat- 1/2 tsp salt to 1 cup warm water, gargle as desired.    Use an antihistamine such as Claritin, Zyrtec or Allegra to dry you out.    Use pseudoephedrine (behind the counter) to decongest. Pseudoephedrine 30 mg up to 240 mg /day. It can raise your blood pressure and give you palpitations.    Use mucinex (guaifenesin) to break up mucous up to 2400mg/day to loosen any mucous.    The mucinex DM pill has a cough suppressant that can be sedating. It can be used at night to stop the tickle at the back of your throat.    You can use Mucinex D (it has guaifenesin and a high dose of pseudoephedrine) in the mornings to help decongest.    Use Afrin in each nare for no longer than 3 days, as it is addictive. It can also dry out your mucous membranes and cause elevated blood pressure. This is especially useful if you are flying.    Use Flonase 1-2 sprays/nostril per day. It is a local acting steroid nasal spray, if you develop a bloody nose, stop using the medication immediately.    Sometimes Nyquil at night is beneficial to help you get some rest, however it is sedating and it  does have an antihistamine, and tylenol.    Honey is a natural cough suppressant that can be used.    Tylenol up to 4,000 mg a day is safe for short periods and can be used for body aches, pain, and fever. However in high doses and prolonged use it can cause liver irritation.    Ibuprofen is a non-steroidal anti-inflammatory that can be used for body aches, pain, and fever.However it can also cause stomach irritation if over used.     Follow up with your PCP or specialty clinic as instructed in the next 2-3 days if not improved or as needed. You can call [phone] to schedule an appointment with appropriate provider.      If you condition worsens, we recommend that you receive another evaluation at the emergency room immediately or contact your primary medical clinic's after hours call service to discuss your concerns.      Please return here or go to the Emergency Department for any concerns or worsening condition.   You can also call [phone] to schedule an appointment with the appropriate provider.    Please return here or go to the Emergency Department for any concerns or worsening of condition.    Thank you for choosing [facility]!    Our goal in the Urgent Care is to always provide outstanding medical care. You may receive a survey by mail or e-mail in the next week regarding your experience today. We would greatly appreciate you completing and returning the survey. Your feedback provides us with a way to recognize our staff who provide very good care, and it helps us learn how to improve when your experience was below our aspiration of excellence.      We appreciate you trusting us with your medical care. We hope you feel better soon. We will be happy to take care of you for all of your future medical needs.    Sincerely,    THONG Tate  Paxlovid is an emergency release (EUA) medication for the treatment of COVID. Paxlovid is an antiviral medication used to treat COVID-19 in  people with mild to moderate illness, and who are at risk for severe disease. The medication--which actually consists of three separate pills, all taken at the same time--is meant to be taken twice a day for five days. It must be taken before day 5 of symptoms. According to the CDC, a COVID-19 rebound is a recurrence of COVID-19 symptoms or a new positive viral test (after previously testing negative), within two to eight days after recovery and stopping Paxlovid. This can happen regardless of vaccination status. If COVID-19 symptoms recur following Paxlovid treatment, they're typically mild and resolve in about three days, the CDC said.  In a recent preprint case report, researchers described the COVID-19 relapse symptoms of eight non-immunocompromised people ages 31-71. The relapse of COVID-19 symptoms--which usually occurred on days nine through 12 of their illness--were described as cold symptoms (runny nose, sore throat), as well as fatigue and headache. People who experience COVID-19 rebounds should still re-start their isolation period once their symptoms recur or they receive another positive test, according to the CDC. Relapsing COVID-19 patients can end their second isolation period after five days if their symptoms are improving and they haven't had a fever in 24 hours--but they should still continue to wear a mask around others for five additional days, to complete the CDC-recommended 10 days of isolation and masking.     ----------------------- DOSAGE AND ADMINISTRATION -----------------------  PAXLOVID is nirmatrelvir tablets co-packaged with ritonavir tablets.  (2.1)  Nirmatrelvir must be co-administered with ritonavir. (2.1)   Initiate PAXLOVID treatment as soon as possible after diagnosis of  COVID-19 and within 5 days of symptom onset. (2.1)   Administer orally with or without food. (2.1)   Dosage: 300 mg nirmatrelvir (two 150 mg tablets) with 100 mg  ritonavir (one 100 mg tablet), with all three  tablets taken together  twice daily for 5 days. (2.1)    Dose reduction for moderate renal impairment (eGFR >=30 to  <60 mL/min): 150 mg nirmatrelvir (one 150 mg tablet) with 100 mg  ritonavir (one 100 mg tablet), with both tablets taken together twice  daily for 5 days. (2.2)   PAXLOVID is not recommended in patients with severe renal  impairment (eGFR <30 mL/min). (2.2, 8.6)   PAXLOVID is not recommend in patients with severe hepatic  impairment (Child-Myrick Class C). (2.3, 8.7)  --------------------- DOSAGE FORMS AND STRENGTHS ---------------------   Tablets: nirmatrelvir 150 mg (3)   Tablets: ritonavir 100 mg (3)  -------------------------------CONTRAINDICATIONS-------------------------------   History of clinically significant hypersensitivity reactions to the active  ingredients (nirmatrelvir or ritonavir) or any other components. (4)   Co-administration with drugs highly dependent on CYP3A for  clearance and for which elevated concentrations are associated with  serious and/or life-threatening reactions. (4, 7.3)   Co-administration with potent CYP3A inducers where significantly  reduced nirmatrelvir or ritonavir plasma concentrations may be  associated with the potential for loss of virologic response and  possible resistance. (4)  ----------------------- WARNINGS AND PRECAUTIONS -----------------------   The concomitant use of PAXLOVID and certain other drugs may  result in potentially significant drug interactions. Consult the full  prescribing information prior to and during treatment for potential  drug interactions. (5.1, 7)   Hepatotoxicity: Hepatic transaminase elevations, clinical hepatitis,  and jaundice have occurred in patients receiving ritonavir. (5.2)   HIV-1 Drug Resistance: PAXLOVID use may lead to a risk of HIV-1  developing resistance to HIV protease inhibitors in individuals with  uncontrolled or undiagnosed HIV-1 infection. (5.3)    Do not take any of the below medications while taking  paxlovid.  PAXLOVID is contraindicated with drugs that are highly dependent on CYP3A for clearance and for  which elevated concentrations are associated with serious and/or life-threatening reactions [see Drug  Interactions (7.3)]:   Alpha1-adrenoreceptor antagonist: alfuzosin   Analgesics: pethidine, propoxyphene   Antianginal: ranolazine   Antiarrhythmic: amiodarone, dronedarone, flecainide, propafenone, quinidine   Anti-gout: colchicine   Antipsychotics: lurasidone, pimozide, clozapine   Ergot derivatives: dihydroergotamine, ergotamine, methylergonovine   HMG-CoA reductase inhibitors: lovastatin, simvastatin   PDE5 inhibitor: sildenafil (Revatio®) when used for pulmonary arterial hypertension (PAH)   Sedative/hypnotics: triazolam, oral midazolam  PAXLOVID is contraindicated with drugs that are potent CYP3A inducers where significantly reduced  nirmatrelvir or ritonavir plasma concentrations may be associated with the potential for loss of  virologic response and possible resistance. PAXLOVID cannot be started immediately after  discontinuation of any of the following medications due to the delayed offset of the recently  discontinued CYP3A inducer [see Drug Interactions (7.3)]:   Anticancer drugs: apalutamide   Anticonvulsant: carbamazepine, phenobarbital, phenytoin   Antimycobacterials: rifampin   Herbal products: Shondas Wort (hypericum perforatum)      FACT SHEET FOR PATIENTS, PARENTS, AND CAREGIVERS  EMERGENCY USE AUTHORIZATION (EUA) OF PAXLOVID  FOR CORONAVIRUS DISEASE 2019 (COVID-19)    You are being given this Fact Sheet because your healthcare provider believes it is  necessary to provide you with PAXLOVID for the treatment of mild-to-moderate  coronavirus disease (COVID-19) caused by the SARS-CoV-2 virus. This Fact Sheet  contains information to help you understand the risks and benefits of taking the  PAXLOVID you have received or may receive.  The [place] Food and Drug Administration (FDA) has issued  an Emergency Use  Authorization (EUA) to make PAXLOVID available during the COVID-19 pandemic (for  more details about an EUA please see What is an Emergency Use Authorization?  at the end of this document). PAXLOVID is not an FDA-approved medicine in the  [place]. Read this Fact Sheet for information about PAXLOVID. Talk to your  healthcare provider about your options or if you have any questions. It is your choice to  take PAXLOVID.  What is COVID-19?  COVID-19 is caused by a virus called a coronavirus. You can get COVID-19 through  close contact with another person who has the virus.  COVID-19 illnesses have ranged from very mild-to-severe, including illness resulting in  death. While information so far suggests that most COVID-19 illness is mild, serious  illness can happen and may cause some of your other medical conditions to become  worse. Older people and people of all ages with severe, long lasting (chronic) medical  conditions like heart disease, lung disease, and diabetes, for example seem to be at  higher risk of being hospitalized for COVID-19.  What is PAXLOVID?  PAXLOVID is an investigational medicine used to treat mild-to-moderate COVID-19 in  adults and children [12 years of age and older weighing at least 88 pounds (40 kg)] with  positive results of direct SARS-CoV-2 viral testing, and who are at high risk for  progression to severe COVID-19, including hospitalization or death. PAXLOVID is  investigational because it is still being studied. There is limited information about the  safety and effectiveness of using PAXLOVID to treat people with mild-to-moderate  COVID-19.  The FDA has authorized the emergency use of PAXLOVID for the treatment of mild-tomoderate COVID-19 in adults and children [12 years of age and older weighing at least  88 pounds (40 kg)] with a positive test for the virus that causes COVID-19, and who are  at high risk for progression to severe COVID-19, including  hospitalization or death,  under an EUA.  1 Revised: 18 March 2022    What should I tell my healthcare provider before I take PAXLOVID?  Tell your healthcare provider if you:  ? Have any allergies  ? Have liver or kidney disease  ? Are pregnant or plan to become pregnant  ? Are breastfeeding a child  ? Have any serious illnesses  Tell your healthcare provider about all the medicines you take, including  prescription and over-the-counter medicines, vitamins, and herbal supplements.  Some medicines may interact with PAXLOVID and may cause serious side effects.  Keep a list of your medicines to show your healthcare provider and pharmacist when  you get a new medicine.  You can ask your healthcare provider or pharmacist for a list of medicines that interact  with PAXLOVID. Do not start taking a new medicine without telling your  healthcare provider. Your healthcare provider can tell you if it is safe to take  PAXLOVID with other medicines.  Tell your healthcare provider if you are taking combined hormonal contraceptive.  PAXLOVID may affect how your birth control pills work. Females who are able to  become pregnant should use another effective alternative form of contraception or an  additional barrier method of contraception. Talk to your healthcare provider if you have  any questions about contraceptive methods that might be right for you.  How do I take PAXLOVID?  ? PAXLOVID consists of 2 medicines: nirmatrelvir and ritonavir.  o Take 2 pink tablets of nirmatrelvir with 1 white tablet of ritonavir by mouth  2 times each day (in the morning and in the evening) for 5 days. For each  dose, take all 3 tablets at the same time.  o If you have kidney disease, talk to your healthcare provider. You  may need a different dose.  ? Swallow the tablets whole. Do not chew, break, or crush the tablets.  ? Take PAXLOVID with or without food.  ? Do not stop taking PAXLOVID without talking to your healthcare provider, even if  you  feel better.  ? If you miss a dose of PAXLOVID within 8 hours of the time it is usually taken,  take it as soon as you remember. If you miss a dose by more than 8 hours, skip  the missed dose and take the next dose at your regular time. Do not take  2 doses of PAXLOVID at the same time.  ? If you take too much PAXLOVID, call your healthcare provider or go to the  nearest hospital emergency room right away.  ? If you are taking a ritonavir- or cobicistat-containing medicine to treat hepatitis C  or Human Immunodeficiency Virus (HIV), you should continue to take your  medicine as prescribed by your healthcare provider.  2 Revised: 18 March 20  Talk to your healthcare provider if you do not feel better or if you feel worse after  5 days.  Who should generally not take PAXLOVID?  Do not take PAXLOVID if:  ? You are allergic to nirmatrelvir, ritonavir, or any of the ingredients in PAXLOVID.  ? You are taking any of the following medicines:  o Alfuzosin  o Pethidine, propoxyphene  o Ranolazine  o Amiodarone, dronedarone, flecainide, propafenone, quinidine  o Colchicine  o Lurasidone, pimozide, clozapine  o Dihydroergotamine, ergotamine, methylergonovine  o Lovastatin, simvastatin  o Sildenafil (Revatio®) for pulmonary arterial hypertension (PAH)  o Triazolam, oral midazolam  o Apalutamide  o Carbamazepine, phenobarbital, phenytoin  o Rifampin  o Shondas Wort (hypericum perforatum)  Taking PAXLOVID with these medicines may cause serious or life-threatening side  effects or affect how PAXLOVID works.  These are not the only medicines that may cause serious side effects if taken with  PAXLOVID. PAXLOVID may increase or decrease the levels of multiple other  medicines. It is very important to tell your healthcare provider about all of the medicines  you are taking because additional laboratory tests or changes in the dose of your other  medicines may be necessary while you are taking PAXLOVID. Your healthcare provider  may  also tell you about specific symptoms to watch out for that may indicate that you  need to stop or decrease the dose of some of your other medicines.  What are the important possible side effects of PAXLOVID?  Possible side effects of PAXLOVID are:  ? Allergic Reactions. Allergic reactions can happen in people taking  PAXLOVID, even after only 1 dose. Stop taking PAXLOVID and call your  healthcare provider right away if you get any of the following symptoms of an  allergic reaction:  o hives  o trouble swallowing or breathing  o swelling of the mouth, lips, or face  o throat tightness  o hoarseness  3 Revised: 18 March 2022    o skin rash  ? Liver Problems. Tell your healthcare provider right away if you have any of  these signs and symptoms of liver problems: loss of appetite, yellowing of your  skin and the whites of eyes (jaundice), dark-colored urine, pale colored stools  and itchy skin, stomach area (abdominal) pain.  ? Resistance to HIV Medicines. If you have untreated HIV infection, PAXLOVID  may lead to some HIV medicines not working as well in the future.  ? Other possible side effects include:  o altered sense of taste  o diarrhea  o high blood pressure  o muscle aches  These are not all the possible side effects of PAXLOVID. Not many people have taken  PAXLOVID. Serious and unexpected side effects may happen. PAXLOVID is still being  studied, so it is possible that all of the risks are not known at this time.  What other treatment choices are there?  Veklury (remdesivir) is FDA-approved for the treatment of mild-to-moderate COVID-19  in certain adults and children. Talk with your doctor to see if Veklury is appropriate for  you.  Like PAXLOVID, FDA may also allow for the emergency use of other medicines to treat  people with COVID-19. Go to https://www.fda.gov/emergency-preparedness-andresponse/mcm-legal-regulatory-and-policy-framework/emergency-use-authorization for  information on the emergency use of  other medicines that are authorized by FDA to  treat people with COVID-19. Your healthcare provider may talk with you about clinical  trials for which you may be eligible.  It is your choice to be treated or not to be treated with PAXLOVID. Should you decide  not to receive it or for your child not to receive it, it will not change your standard  medical care.  What if I am pregnant or breastfeeding?  There is no experience treating pregnant women or breastfeeding mothers with  PAXLOVID. For a mother and unborn baby, the benefit of taking PAXLOVID may be  greater than the risk from the treatment. If you are pregnant, discuss your options and  specific situation with your healthcare provider.  It is recommended that you use effective barrier contraception or do not have sexual  activity while taking PAXLOVID.  If you are breastfeeding, discuss your options and specific situation with your  healthcare provider.  4 Revised: 18 March 2022    How do I report side effects with PAXLOVID?  Contact your healthcare provider if you have any side effects that bother you or do not  go away.  Report side effects to FDA MedWatch at www.fda.gov/medwatch or call 1-384-IIP6748 or you can report side effects to [employer] at the contact information provided  below.  Website Fax number Telephone number  [employer] [phone] [phone]  How should I store PAXLOVID?  Store PAXLOVID tablets at room temperature, between 68?F to 77?F (20?C to 25?C).  How can I learn more about COVID-19?  ? Ask your healthcare provider.  ? Visit https://www.cdc.gov/COVID19.  ? Contact your local or state public health department.  What is an Emergency Use Authorization (EUA)?  The [place] FDA has made PAXLOVID available under an emergency access  mechanism called an Emergency Use Authorization (EUA). The EUA is supported by a  [place] of Health and Human Service (HHS) declaration that circumstances exist to  justify the  emergency use of drugs and biological products during the COVID-19  pandemic.  PAXLOVID for the treatment of mild-to-moderate COVID-19 in adults and children  [12 years of age and older weighing at least 88 pounds (40 kg)] with positive results of  direct SARS-CoV-2 viral testing, and who are at high risk for progression to severe  COVID-19, including hospitalization or death, has not undergone the same type of  review as an FDA-approved product. In issuing an EUA under the COVID-19 public  health emergency, the FDA has determined, among other things, that based on the  total amount of scientific evidence available including data from adequate and  well-controlled clinical trials, if available, it is reasonable to believe that the product may  be effective for diagnosing, treating, or preventing COVID-19, or a serious or  life-threatening disease or condition caused by COVID-19; that the known and potential  benefits of the product, when used to diagnose, treat, or prevent such disease or  condition, outweigh the known and potential risks of such product; and that there are no  adequate, approved, and available alternatives.  All of these criteria must be met to allow for the product to be used in the treatment of  patients during the COVID-19 pandemic. The EUA for PAXLOVID is in effect for the  duration of the COVID-19 declaration justifying emergency use of this product, unless  terminated or revoked (after which the products may no longer be used under the  EUA).  5 Revised: 18 March 2022          Additional Information  For general questions, visit the website or call the telephone number provided below.  Website Telephone number  www.[employer]  [phone]  (3-427-C63-PACK)  You can also go to [email or web address] or call [phone] for more  information.  [id number]  Revised: 18 March 2022